# Patient Record
Sex: FEMALE | Race: WHITE | NOT HISPANIC OR LATINO | Employment: FULL TIME | URBAN - METROPOLITAN AREA
[De-identification: names, ages, dates, MRNs, and addresses within clinical notes are randomized per-mention and may not be internally consistent; named-entity substitution may affect disease eponyms.]

---

## 2019-07-17 ENCOUNTER — TRANSCRIBE ORDERS (OUTPATIENT)
Dept: ADMINISTRATIVE | Facility: HOSPITAL | Age: 49
End: 2019-07-17

## 2019-07-17 DIAGNOSIS — K21.9 GERD WITHOUT ESOPHAGITIS: Primary | ICD-10-CM

## 2019-08-08 ENCOUNTER — HOSPITAL ENCOUNTER (OUTPATIENT)
Facility: HOSPITAL | Age: 49
Setting detail: HOSPITAL OUTPATIENT SURGERY
Discharge: HOME | End: 2019-08-08
Attending: SURGERY | Admitting: SURGERY

## 2019-08-08 PROCEDURE — 91034 GASTROESOPHAGEAL REFLUX TEST: CPT | Performed by: SURGERY

## 2019-08-08 PROCEDURE — 91010 ESOPHAGUS MOTILITY STUDY: CPT | Performed by: SURGERY

## 2019-08-08 RX ORDER — LIDOCAINE HYDROCHLORIDE 20 MG/ML
SOLUTION OROPHARYNGEAL AS NEEDED
Status: DISCONTINUED | OUTPATIENT
Start: 2019-08-08 | End: 2019-08-08 | Stop reason: HOSPADM

## 2019-08-15 ENCOUNTER — HOSPITAL ENCOUNTER (OUTPATIENT)
Dept: GENERAL RADIOLOGY | Facility: HOSPITAL | Age: 49
Discharge: HOME | End: 2019-08-15
Admitting: SURGERY

## 2019-08-15 ENCOUNTER — HOSPITAL ENCOUNTER (OUTPATIENT)
Dept: NUCLEAR MEDICINE | Facility: HOSPITAL | Age: 49
Discharge: HOME | End: 2019-08-15

## 2019-08-15 DIAGNOSIS — K21.9 GERD WITHOUT ESOPHAGITIS: ICD-10-CM

## 2019-08-15 PROCEDURE — 0 TECHNETIUM SULFUR COLLOID: Performed by: SURGERY

## 2019-08-15 PROCEDURE — A9541 TC99M SULFUR COLLOID: HCPCS | Performed by: SURGERY

## 2019-08-15 PROCEDURE — 78264 GASTRIC EMPTYING IMG STUDY: CPT

## 2019-08-15 PROCEDURE — 74220 X-RAY XM ESOPHAGUS 1CNTRST: CPT

## 2019-08-15 RX ADMIN — BARIUM SULFATE 150 ML: 960 POWDER, FOR SUSPENSION ORAL at 13:34

## 2019-08-15 RX ADMIN — TECHNETIUM TC 99M SULFUR COLLOID 1 DOSE: KIT at 11:29

## 2021-03-29 ENCOUNTER — TRANSCRIBE ORDERS (OUTPATIENT)
Dept: ADMINISTRATIVE | Facility: HOSPITAL | Age: 51
End: 2021-03-29

## 2021-03-29 DIAGNOSIS — Z12.31 VISIT FOR SCREENING MAMMOGRAM: Primary | ICD-10-CM

## 2021-04-01 ENCOUNTER — OFFICE VISIT (OUTPATIENT)
Dept: OBSTETRICS AND GYNECOLOGY | Facility: CLINIC | Age: 51
End: 2021-04-01

## 2021-04-01 ENCOUNTER — HOSPITAL ENCOUNTER (OUTPATIENT)
Dept: MAMMOGRAPHY | Facility: HOSPITAL | Age: 51
Discharge: HOME | End: 2021-04-01
Admitting: OBSTETRICS & GYNECOLOGY

## 2021-04-01 VITALS
SYSTOLIC BLOOD PRESSURE: 100 MMHG | HEIGHT: 65 IN | DIASTOLIC BLOOD PRESSURE: 62 MMHG | WEIGHT: 140 LBS | BODY MASS INDEX: 23.32 KG/M2

## 2021-04-01 DIAGNOSIS — N89.8 VAGINAL MASS: ICD-10-CM

## 2021-04-01 DIAGNOSIS — Z01.419 WOMEN'S ANNUAL ROUTINE GYNECOLOGICAL EXAMINATION: Primary | ICD-10-CM

## 2021-04-01 DIAGNOSIS — Z12.31 VISIT FOR SCREENING MAMMOGRAM: ICD-10-CM

## 2021-04-01 PROCEDURE — 77063 BREAST TOMOSYNTHESIS BI: CPT

## 2021-04-01 PROCEDURE — 77067 SCR MAMMO BI INCL CAD: CPT | Performed by: RADIOLOGY

## 2021-04-01 PROCEDURE — 99396 PREV VISIT EST AGE 40-64: CPT | Performed by: OBSTETRICS & GYNECOLOGY

## 2021-04-01 PROCEDURE — 77067 SCR MAMMO BI INCL CAD: CPT

## 2021-04-01 PROCEDURE — 77063 BREAST TOMOSYNTHESIS BI: CPT | Performed by: RADIOLOGY

## 2021-04-01 PROCEDURE — 99212 OFFICE O/P EST SF 10 MIN: CPT | Performed by: OBSTETRICS & GYNECOLOGY

## 2021-04-01 RX ORDER — ALPRAZOLAM 1 MG/1
TABLET ORAL
COMMUNITY
Start: 2021-03-27

## 2021-04-01 RX ORDER — BISOPROLOL FUMARATE 10 MG/1
TABLET, FILM COATED ORAL
COMMUNITY
Start: 2021-03-20

## 2021-04-01 RX ORDER — FLUOXETINE 20 MG/1
TABLET, FILM COATED ORAL
COMMUNITY
Start: 2021-02-09

## 2021-04-01 RX ORDER — MESALAMINE 1.2 G/1
TABLET, DELAYED RELEASE ORAL
COMMUNITY
Start: 2021-03-20

## 2021-04-01 RX ORDER — LAMOTRIGINE 200 MG/1
TABLET ORAL
COMMUNITY
Start: 2021-03-20

## 2021-04-01 RX ORDER — DEXLANSOPRAZOLE 60 MG/1
CAPSULE, DELAYED RELEASE ORAL
COMMUNITY
Start: 2021-03-20

## 2021-04-01 NOTE — PROGRESS NOTES
GYN Annual Exam     CC - Here for annual exam.        HPI  Kailey Young is a 50 y.o. female, , who presents for annual well woman exam. No LMP recorded. Patient has had an ablation..  Periods are absent .  Dysmenorrhea:none.  Patient reports problems with: none. There were no changes to her medical or surgical history since her last visit.. Partner Status: Marital Status: .  New Partners since last visit: no.  Desires STD Screening: no.    Additional OB/GYN History   Current contraception: contraceptive methods: Vasectomy   Desires to: do not start contraception  Last Pap :   Last Completed Pap Smear       Status Date      PAP SMEAR Done 2018 negative        History of abnormal Pap smear: yes - atypia many years ago  Family history of uterine, colon, breast, or ovarian cancer: yes - colon cancer in her maternal grandmother and grandfather and breast cancer in her paternal aunt  Performs monthly Self-Breast Exam: no  Last mammogram: Completed this morning  Last Completed Mammogram       Status Date      MAMMOGRAM Done 2016 MAMMO DIAGNOSTIC RIGHT W CAD     Patient has more history with this topic...         Exercises Regularly: yes  Feelings of Anxiety or Depression: yes - controlled with medication  Tobacco Usage?: No   OB History        1    Para   1    Term   1            AB        Living   1       SAB        TAB        Ectopic        Molar        Multiple        Live Births                    Health Maintenance   Topic Date Due   • Annual Gynecologic Pelvic and Breast Exam  Never done   • COLONOSCOPY  Never done   • ANNUAL PHYSICAL  Never done   • TDAP/TD VACCINES (1 - Tdap) Never done   • HEPATITIS C SCREENING  Never done   • MAMMOGRAM  2021   • INFLUENZA VACCINE  2021   • PAP SMEAR  2021   • Pneumococcal Vaccine 0-64  Aged Out   • MENINGOCOCCAL VACCINE  Aged Out       The additional following portions of the patient's history were reviewed and updated  "as appropriate: allergies, current medications, past family history, past medical history, past social history, past surgical history and problem list.    Review of Systems   Constitutional: Negative.    HENT: Negative.    Eyes: Negative.    Respiratory: Negative.    Cardiovascular: Negative.    Gastrointestinal: Negative.    Endocrine: Negative.    Genitourinary: Negative.    Musculoskeletal: Negative.    Skin: Negative.    Allergic/Immunologic: Negative.    Neurological: Negative.    Hematological: Negative.    Psychiatric/Behavioral: Negative.      All other systems reviewed and are negative.     I have reviewed and agree with the HPI, ROS, and historical information as entered above. Lucas Cifuentes MD    Objective   /62   Ht 165.1 cm (65\")   Wt 63.5 kg (140 lb)   BMI 23.30 kg/m²     Physical Exam  Vitals and nursing note reviewed. Exam conducted with a chaperone present.   Constitutional:       Appearance: She is well-developed.   HENT:      Head: Normocephalic and atraumatic.   Neck:      Thyroid: No thyroid mass or thyromegaly.   Cardiovascular:      Rate and Rhythm: Normal rate and regular rhythm.      Heart sounds: No murmur heard.     Pulmonary:      Effort: Pulmonary effort is normal. No retractions.      Breath sounds: Normal breath sounds. No wheezing, rhonchi or rales.   Chest:      Chest wall: No mass or tenderness.      Breasts:         Right: Normal. No mass, nipple discharge, skin change or tenderness.         Left: Normal. No mass, nipple discharge, skin change or tenderness.   Abdominal:      General: Bowel sounds are normal.      Palpations: Abdomen is soft. Abdomen is not rigid. There is no mass.      Tenderness: There is no abdominal tenderness. There is no guarding.      Hernia: No hernia is present. There is no hernia in the left inguinal area.   Genitourinary:     Labia:         Right: No rash, tenderness or lesion.         Left: No rash, tenderness or lesion.       Vagina: Normal. " No vaginal discharge or lesions.      Cervix: No cervical motion tenderness, discharge, lesion or cervical bleeding.      Uterus: Normal. Not enlarged, not fixed and not tender.       Adnexa:         Right: No mass or tenderness.          Left: No mass or tenderness.        Rectum: No external hemorrhoid.            Comments: There is a 1 to 2 cm hard smooth round mass at 12:00 about 1-1/2 to 2 cm anterior to the cervix on the front of the vaginal wall, there is is retroverted feels slightly enlarged.  Musculoskeletal:      Cervical back: Normal range of motion. No muscular tenderness.   Neurological:      Mental Status: She is alert and oriented to person, place, and time.   Psychiatric:         Behavior: Behavior normal.            Assessment and Plan    Problem List Items Addressed This Visit     None      Visit Diagnoses     Women's annual routine gynecological examination    -  Primary    Relevant Orders    Pap IG, Rfx HPV ASCU    Vaginal mass            1. Ultrasound done by me in the room revealed the uterus which appears to have small fibroids.  I cannot elucidate this mass that is in the 12 o'clock position in the anterior wall of the vagina just head of the cervix.  Feels like a benign lesion either a sebaceous cyst ectopic fibroid or some other vaginal mass.  We will repeat an ultrasound in 1 month and decide what to do removing it would require hospitalization and outpatient surgery.  It could be a urethral diverticulum.  2. Fibrocystic breast changes - Encouraged decreasing caffeine, supportive bra, low dose vitamin E supplementation.  3. Reviewed monthly self breast exams.  Instructed to call with lumps, pain, or breast discharge.    4. Symptoms of menopausal transition reviewed with patient.   5. Other: She will return in 1 month for an ultrasound to check the vaginal mass.    Lucas Cifuentes MD  04/01/2021

## 2021-04-06 DIAGNOSIS — Z01.419 WOMEN'S ANNUAL ROUTINE GYNECOLOGICAL EXAMINATION: ICD-10-CM

## 2021-04-27 ENCOUNTER — HOSPITAL ENCOUNTER (OUTPATIENT)
Dept: MAMMOGRAPHY | Facility: HOSPITAL | Age: 51
Discharge: HOME | End: 2021-04-27

## 2021-04-27 ENCOUNTER — HOSPITAL ENCOUNTER (OUTPATIENT)
Dept: ULTRASOUND IMAGING | Facility: HOSPITAL | Age: 51
Discharge: HOME | End: 2021-04-27

## 2021-04-27 DIAGNOSIS — R92.8 ABNORMAL MAMMOGRAM: ICD-10-CM

## 2021-04-27 PROCEDURE — G0279 TOMOSYNTHESIS, MAMMO: HCPCS

## 2021-04-27 PROCEDURE — 76642 ULTRASOUND BREAST LIMITED: CPT

## 2021-04-27 PROCEDURE — 77065 DX MAMMO INCL CAD UNI: CPT | Performed by: RADIOLOGY

## 2021-04-27 PROCEDURE — 76642 ULTRASOUND BREAST LIMITED: CPT | Performed by: RADIOLOGY

## 2021-04-27 PROCEDURE — 77061 BREAST TOMOSYNTHESIS UNI: CPT | Performed by: RADIOLOGY

## 2021-04-27 PROCEDURE — 77065 DX MAMMO INCL CAD UNI: CPT

## 2021-04-29 ENCOUNTER — OFFICE VISIT (OUTPATIENT)
Dept: OBSTETRICS AND GYNECOLOGY | Facility: CLINIC | Age: 51
End: 2021-04-29

## 2021-04-29 VITALS
BODY MASS INDEX: 22.99 KG/M2 | WEIGHT: 138 LBS | SYSTOLIC BLOOD PRESSURE: 100 MMHG | HEIGHT: 65 IN | DIASTOLIC BLOOD PRESSURE: 70 MMHG

## 2021-04-29 DIAGNOSIS — N89.8 VAGINAL MASS: ICD-10-CM

## 2021-04-29 DIAGNOSIS — D25.1 FIBROIDS, INTRAMURAL: Primary | ICD-10-CM

## 2021-04-29 PROCEDURE — 99212 OFFICE O/P EST SF 10 MIN: CPT | Performed by: OBSTETRICS & GYNECOLOGY

## 2021-04-29 NOTE — PROGRESS NOTES
Chief Complaint   Patient presents with   • Follow-up     u/s for vaginal mass       Subjective   HPI  Kailey Young is a 50 y.o. female, , who presents for follow up u/s for vaginal mass on exam, pain with IC, bleeding with IC.        Her last LMP was No LMP recorded. Patient has had an ablation..  Periods are absent due to h/o ablation, lasting 0 days.  Dysmenorrhea:none.  Patient reports problems with: pain with IC, bleeding with IC.  Partner Status: Marital Status: .  New Partners since last visit: no.  Desires STD Screening: no.    Additional OB/GYN History   Current contraception: contraceptive methods: None  Desires to: do not start contraception  Last Pap :   Last Completed Pap Smear       Status Date      PAP SMEAR Done 2021 SCANNED - PAP SMEAR     Patient has more history with this topic...        History of abnormal Pap smear: yes - No  Last mammogram:   Last Completed Mammogram       Status Date      MAMMOGRAM Done 2021 MAMMO DIAGNOSTIC DIGITAL TOMOSYNTHESIS RIGHT W CAD     Patient has more history with this topic...        Tobacco Usage?: No   OB History        1    Para   1    Term   1            AB        Living   1       SAB        TAB        Ectopic        Molar        Multiple        Live Births                    Health Maintenance   Topic Date Due   • COLONOSCOPY  Never done   • ANNUAL PHYSICAL  Never done   • TDAP/TD VACCINES (1 - Tdap) Never done   • HEPATITIS C SCREENING  Never done   • INFLUENZA VACCINE  2021   • Annual Gynecologic Pelvic and Breast Exam  2022   • MAMMOGRAM  2022   • PAP SMEAR  2024   • COVID-19 Vaccine  Completed   • Pneumococcal Vaccine 0-64  Aged Out       The additional following portions of the patient's history were reviewed and updated as appropriate: allergies and current medications.    Review of Systems   Constitutional: Negative.    HENT: Negative.    Respiratory: Negative.    Cardiovascular:  "Negative.    Gastrointestinal: Negative.    Genitourinary: Positive for dyspareunia.   Musculoskeletal: Negative.    Skin: Negative.    Allergic/Immunologic: Negative.    Neurological: Negative.    Hematological: Negative.    Psychiatric/Behavioral: Negative.        I have reviewed and agree with the HPI, ROS, and historical information as entered above. Lucas Cifuentes MD    Objective   /70   Ht 165.1 cm (65\")   Wt 62.6 kg (138 lb)   BMI 22.96 kg/m²     Physical Exam not done    Assessment/Plan     Assessment     Problem List Items Addressed This Visit     None      Visit Diagnoses     Fibroids, intramural    -  Primary    Vaginal mass              1. Patient on ultrasound has a 2 cm mass in the anterior vaginal fornix adjacent to the cervix.  I cannot see it but I can feel it.  Ultrasound confirms what looks like an amorphous pelvic mass in the vagina at that spot which is not cystic.  She also has several fibroids in the uterus.  She has had an ablation as well.  She has some sort of pain with intercourse which may be related to this mass.    Plan     Return in about 1 week (around 5/6/2021) for will drain breast cyst then .  1. Due to the odd nature of this finding, I have recommended she see Dr. Khoury her partner for evaluation and possible removal.  Mass may connect to the cervix could be different from anything of seen and may involve the bladder.  I do not feel comfortable removing it myself plus she may need a hysterectomy at the same time.  I am glad Dr. Khoury evaluate the mass and decide was the best course of action.  She may see Dr. Hernandez her partner      Lucas Cifuentes MD  04/29/2021    "

## 2021-05-03 ENCOUNTER — OFFICE VISIT (OUTPATIENT)
Dept: GYNECOLOGIC ONCOLOGY | Facility: CLINIC | Age: 51
End: 2021-05-03

## 2021-05-03 VITALS
DIASTOLIC BLOOD PRESSURE: 68 MMHG | BODY MASS INDEX: 23.22 KG/M2 | TEMPERATURE: 97.8 F | HEIGHT: 64 IN | RESPIRATION RATE: 16 BRPM | WEIGHT: 136 LBS | SYSTOLIC BLOOD PRESSURE: 114 MMHG | OXYGEN SATURATION: 98 % | HEART RATE: 60 BPM

## 2021-05-03 DIAGNOSIS — N89.8 VAGINAL MASS: Primary | ICD-10-CM

## 2021-05-03 PROCEDURE — 99204 OFFICE O/P NEW MOD 45 MIN: CPT | Performed by: OBSTETRICS & GYNECOLOGY

## 2021-05-03 NOTE — PROGRESS NOTES
New Patient Office Visit      Patient Name: Kailey Young  : 1970   MRN: [id number]     Referring Physician: Lucas Cifuentes MD    Chief Complaint: Vaginal Mass     History of Present Illness: Kailey Young is a 51 y.o. female who is here today to establish care with Oncology. The patient complains of a anterior vaginal wall mass.  She is s/p endometrial ablation. Patient on ultrasound has a 2 cm mass in the anterior vaginal fornix adjacent to the cervix. Ultrasound confirms what looks like an amorphous pelvic mass in the vagina at that spot which is not cystic.  She also has several fibroids in the uterus. Patient reports pain with intercourse since the beginning of the pandemic. Pain is also reproducible on speculum exam and during transvaginal ultrasound. Pain is significant enough during intercourse that she does have to occasionally stop. She would like definitive surgical management of this problem.     Past Medical History:   Past Medical History:   Diagnosis Date   • Anemia    • Colon polyps    • Depression    • Hx of colonic polyps    • Hypertension    • IBS (irritable bowel syndrome)    • Ulcerative colitis (CMS/HCC)        Past Surgical History:   Past Surgical History:   Procedure Laterality Date   • ADENOIDECTOMY     • BREAST BIOPSY Bilateral    • BREAST EXCISIONAL BIOPSY Left 2016    BENIGN   • DIAGNOSTIC LAPAROSCOPY     • EAR TUBES     • ENDOMETRIAL ABLATION     • ESOPHAGEAL MOTILITY STUDY N/A 2019    Procedure: MANOMETRY;  Surgeon: Anup Fletcher MD;  Location:  [facility];  Service: Gastroenterology   • FRACTURE SURGERY      arm   • GASTRIC PH MONITORING 24HR  2019    Procedure: GASTRIC PH MONITORING 24HR;  Surgeon: Anup Fletcher MD;  Location:  [facility];  Service: Gastroenterology   • WISDOM TOOTH EXTRACTION         Family History:   Family History   Problem Relation Age of Onset   • Breast cancer Paternal Aunt    • Hypertension Father    •  Liver cancer Father    • Arthritis Mother    • Osteoporosis Mother    • Thyroid disease Mother    • Colon cancer Maternal Grandmother    • Colon cancer Maternal Grandfather    • Colon cancer Paternal Uncle    • Ovarian cancer Neg Hx    • Uterine cancer Neg Hx    • Melanoma Neg Hx        Social History:   Social History     Socioeconomic History   • Marital status:      Spouse name: Not on file   • Number of children: Not on file   • Years of education: Not on file   • Highest education level: Not on file   Tobacco Use   • Smoking status: Never Smoker   • Smokeless tobacco: Never Used   Vaping Use   • Vaping Use: Never used   Substance and Sexual Activity   • Alcohol use: Never     Comment: light   • Drug use: Never   • Sexual activity: Yes       Past OB/GYN History:   OB History    Para Term  AB Living   1 1 1     1   SAB TAB Ectopic Molar Multiple Live Births                    # Outcome Date GA Lbr Julio/2nd Weight Sex Delivery Anes PTL Lv   1 Term            1   Denies any complications with delivery.  Has a history of endometrial ablation due to heavy bleeding.   Denies any history of abnormal pap tests     Health Maintenance:   Mammogram: Date:  Results: large right breast cyst but otherwise benign  Colonoscopy: Date: ?, history of colonic polyps and ulcerative colitis    Medications:     Current Outpatient Medications:   •  ALPRAZolam (XANAX) 1 MG tablet, , Disp: , Rfl:   •  bisoprolol (ZEBeta) 10 MG tablet, , Disp: , Rfl:   •  Dexilant 60 MG capsule, , Disp: , Rfl:   •  FLUoxetine (PROzac) 20 MG tablet, , Disp: , Rfl:   •  lamoTRIgine (LaMICtal) 200 MG tablet, , Disp: , Rfl:   •  mesalamine (LIALDA) 1.2 g EC tablet, , Disp: , Rfl:     Allergies:   Allergies   Allergen Reactions   • Codeine Nausea And Vomiting       Review of Systems:   Review of Systems   Constitutional: Negative for appetite change, chills, fatigue, fever and unexpected weight change.   HENT: Negative for congestion  "and sore throat.    Eyes: Negative for visual disturbance.   Respiratory: Negative for cough, shortness of breath and wheezing.    Cardiovascular: Negative for chest pain, palpitations and leg swelling.   Gastrointestinal: Negative for abdominal distention, abdominal pain, constipation, diarrhea, nausea and vomiting.   Genitourinary: Positive for dyspareunia and pelvic pain. Negative for dysuria, frequency, hematuria, urgency and vaginal bleeding.   Neurological: Negative for dizziness, light-headedness, numbness and headaches.        Objective     Physical Exam:  Vital Signs:   Vitals:    05/03/21 1416   BP: 114/68  Comment: LUE   Pulse: 60   Resp: 16   Temp: 97.8 °F (36.6 °C)   TempSrc: Infrared   SpO2: 98%  Comment: RA   Weight: 61.7 kg (136 lb)   Height: 162.6 cm (64\")   PainSc: 0-No pain     BMI: Body mass index is 23.34 kg/m².   ECOG score: 0           PHQ-2 Depression Screening  Little interest or pleasure in doing things? 1   Feeling down, depressed, or hopeless? 2   PHQ-2 Total Score 15     Physical Exam  Vitals and nursing note reviewed. Exam conducted with a chaperone present.   Constitutional:       General: She is not in acute distress.     Appearance: Normal appearance. She is well-developed. She is not diaphoretic.   HENT:      Head: Normocephalic and atraumatic.      Right Ear: External ear normal.      Left Ear: External ear normal.      Nose: Nose normal.   Eyes:      General: No scleral icterus.        Right eye: No discharge.         Left eye: No discharge.      Conjunctiva/sclera: Conjunctivae normal.   Neck:      Thyroid: No thyromegaly.   Cardiovascular:      Rate and Rhythm: Normal rate and regular rhythm.      Heart sounds: No murmur heard.     Pulmonary:      Effort: Pulmonary effort is normal. No respiratory distress.      Breath sounds: Normal breath sounds. No wheezing.   Abdominal:      General: Bowel sounds are normal. There is no distension.      Palpations: Abdomen is soft. There is " "no mass.      Tenderness: There is no abdominal tenderness. There is no guarding.      Hernia: No hernia is present.   Genitourinary:     Comments: External genitalia normal. Vagina without discharge. 2 cm smooth-walled mass along the left anterio-lateral vaginal wall. Minimally tender on exam. Cervix normal. Uterus normal and mobile. No adnexal masses. Rectovaginal exam deferred.  Musculoskeletal:         General: No swelling, tenderness, deformity or signs of injury.      Cervical back: Normal range of motion and neck supple.      Right lower leg: No edema.      Left lower leg: No edema.   Lymphadenopathy:      Cervical: No cervical adenopathy.   Skin:     General: Skin is warm and dry.      Findings: No erythema, lesion or rash.   Neurological:      General: No focal deficit present.      Mental Status: She is alert and oriented to person, place, and time. Mental status is at baseline.   Psychiatric:         Behavior: Behavior normal.         Thought Content: Thought content normal.         Assessment / Plan    Kailey Young is a 51 y.o. who presents today with a vaginal mass. Report of ultrasound from Dr. Cifuentes's office with an \"amorphous 2 cm mass in vaginal wall that is solid in appearance\". I am unable to review imaging. Exam today consistent with 2 cm mass in typical location for Rios's duct cyst. It does not feel like it is attached to the cervix or uterus. However, in light of patient's desire for surgical removal due to symptoms I would like to obtain a pelvic MRI to better delineate the soft tissue structures of the pelvis and for surgical planning.  I am hopeful that this will be amenable to a vaginal approach for excision of this mass.  We did discuss that in the event that we can do this vaginally this would be an outpatient procedure.  Risk of the procedure will be quite minimal for may include bleeding, infection, injury to neighboring structures such as the bladder or the bowel though " this would be unlikely.  We also discussed the unlikely event of vaginal scarring.  If an MRI does demonstrate that this mass is attached to the cervix we may have to proceed with an abdominal route of excision including a hysterectomy for complete surgical removal.  I will review the patient's MRI images and call her at home with these results to discuss next steps. Patient verbalizes understanding.    History of depression with elevated PHQ-2: Currently on xanax 1 mg daily, prozac 20 mg and lmictal 200 mg per PCP. Recommend continued follow up with them.    History of ulcerative colitis: Continue mesalamine. Will ultimately need to hold this 1 day prior to and 3 days postoperatively.     Pain assessment was performed today as a part of patient’s care.  For patients with pain related to surgery, gynecologic malignancy or cancer treatment, the plan is as noted in the assessment/plan.  For patients with pain not related to these issues, they are to seek any further needed care from a more appropriate provider, such as PCP.      Diagnoses and all orders for this visit:    1. Vaginal mass (Primary)  -     US Non-OB Transvaginal; Future  -     MRI Pelvis With & Without Contrast; Future      Follow Up: Pending MRI results    A total of 45 minutes was spent on this visit, with 40 minutes spent reviewing previous notes, counseling the patient on differential diagnosis of vaginal masses and their management approaches, ordering tests, refilling meds, and documenting the findings in the note.     JESSICA Hernandez MD  Gynecologic Oncology     Please note that portions of this note may have been completed with a voice recognition program.

## 2021-05-06 ENCOUNTER — OFFICE VISIT (OUTPATIENT)
Dept: OBSTETRICS AND GYNECOLOGY | Facility: CLINIC | Age: 51
End: 2021-05-06

## 2021-05-06 VITALS
WEIGHT: 135.4 LBS | HEIGHT: 65 IN | BODY MASS INDEX: 22.56 KG/M2 | SYSTOLIC BLOOD PRESSURE: 110 MMHG | DIASTOLIC BLOOD PRESSURE: 70 MMHG

## 2021-05-06 DIAGNOSIS — N60.01 BENIGN BREAST CYST IN FEMALE, RIGHT: Primary | ICD-10-CM

## 2021-05-06 PROCEDURE — 19000 PUNCTURE ASPIR CYST BREAST: CPT | Performed by: OBSTETRICS & GYNECOLOGY

## 2021-05-06 NOTE — PROGRESS NOTES
Breast cyst aspiration        Patient right breast prepped with Betadine at the 11 o'clock position.  A number 18-gauge needle was used to aspirate the cyst which was drained of 14 cc of straw-colored fluid.  There was no anesthesia needed and the patient tolerated procedure very well with no complications or complaints.  This fluid will be sent to pathology.

## 2021-05-06 NOTE — PROGRESS NOTES
Chief Complaint   Patient presents with   • Follow-up     Breast Cyst       Subjective     HPI  Kailey Young is a 51 y.o. female, , who presents with breast complaints of a lump.  This is present in right breast(s).    She states she has experienced this problem for 1 month.  She describes the severity as localized.  She states that the problem is stable.  She reports breast lump and breast tenderness.  The patient denies changed mole, dryness and pruritus    Her last LMP was No LMP recorded. Patient has had an ablation..  Periods are absent due to ablation .    Current contraception: None    Last mammogram:   Last Completed Mammogram          MAMMOGRAM (Yearly) Next due on 2021  Mammo Diagnostic Digital Tomosynthesis Right With CAD    2021  Mammo Screening Digital Tomosynthesis Bilateral With CAD    2016  Mammo diagnostic right w CAD    2016  Mammo screening bilateral w CAD    2015  MAMMOGRAPHY SCREENING BILATERAL    Only the first 5 history entries have been loaded, but more history exists.              Tobacco Usage?: No     OB History        1    Para   1    Term   1            AB        Living   1       SAB        TAB        Ectopic        Molar        Multiple        Live Births                    Past Medical History:   Diagnosis Date   • Anemia    • Colon polyps    • Depression    • Hx of colonic polyps    • Hypertension    • IBS (irritable bowel syndrome)    • Ulcerative colitis (CMS/HCC)        Past Surgical History:   Procedure Laterality Date   • ADENOIDECTOMY     • BREAST BIOPSY Bilateral    • BREAST EXCISIONAL BIOPSY Left     BENIGN   • DIAGNOSTIC LAPAROSCOPY     • EAR TUBES     • ENDOMETRIAL ABLATION     • ESOPHAGEAL MOTILITY STUDY N/A 2019    Procedure: MANOMETRY;  Surgeon: Anup Fletcher MD;  Location: [facility];  Service: Gastroenterology   • FRACTURE SURGERY      arm   • GASTRIC PH MONITORING 24HR   "8/8/2019    Procedure: GASTRIC PH MONITORING 24HR;  Surgeon: Anup Fletcher MD;  Location: [facility];  Service: Gastroenterology   • WISDOM TOOTH EXTRACTION         Family History   Problem Relation Age of Onset   • Breast cancer Paternal Aunt    • Hypertension Father    • Liver cancer Father    • Arthritis Mother    • Osteoporosis Mother    • Thyroid disease Mother    • Colon cancer Maternal Grandmother    • Colon cancer Maternal Grandfather    • Colon cancer Paternal Uncle    • Ovarian cancer Neg Hx    • Uterine cancer Neg Hx    • Melanoma Neg Hx    • Prostate cancer Neg Hx    • Deep vein thrombosis Neg Hx    • Pulmonary embolism Neg Hx    • Coronary artery disease Neg Hx    • Stroke Neg Hx    • Diabetes Neg Hx           Review of Systems   Constitutional: Negative.    HENT: Negative.    Eyes: Negative.    Respiratory: Negative.    Cardiovascular: Negative.    Gastrointestinal: Negative.    Endocrine: Negative.    Genitourinary: Negative.    Musculoskeletal: Negative.    Skin: Negative.    Allergic/Immunologic: Negative.    Neurological: Negative.    Hematological: Negative.    Psychiatric/Behavioral: Negative.        I have reviewed and agree with the HPI, ROS, and historical information as entered above. Lucas Cifuentes MD    Objective   /70   Ht 165.1 cm (65\")   Wt 61.4 kg (135 lb 6.4 oz)   Breastfeeding No   BMI 22.53 kg/m²     Physical Exam  Vitals and nursing note reviewed. Exam conducted with a chaperone present.   Pulmonary:      Effort: No retractions.   Chest:      Chest wall: No mass.      Breasts:         Right: No mass, nipple discharge, skin change or tenderness.         Left: No mass, nipple discharge, skin change or tenderness.          Comments: Six 8 cm area of cyst          Assessment/Plan     Assessment     Problem List Items Addressed This Visit     None      Visit Diagnoses     Benign breast cyst in female, right    -  Primary    Relevant Orders    Tissue Pathology Exam    "         Plan   1.  Discussed fibrocystic disease and alleviating measures.  2. Breast cyst right breast at 9:00 aspirated of 14 cc of straw-colored fluid  3. Patient also is having surgery with Dr. Khoury to remove a cystic structure in the upper vagina.  This may include a hysterectomy  No follow-ups on file.      Lucas Cifuentes MD  05/06/2021

## 2021-05-10 ENCOUNTER — HOSPITAL ENCOUNTER (OUTPATIENT)
Dept: MRI IMAGING | Facility: HOSPITAL | Age: 51
Discharge: HOME | End: 2021-05-10
Admitting: OBSTETRICS & GYNECOLOGY

## 2021-05-10 DIAGNOSIS — N89.8 VAGINAL MASS: ICD-10-CM

## 2021-05-10 PROCEDURE — 82565 ASSAY OF CREATININE: CPT

## 2021-05-10 PROCEDURE — 72197 MRI PELVIS W/O & W/DYE: CPT

## 2021-05-10 PROCEDURE — A9577 INJ MULTIHANCE: HCPCS | Performed by: OBSTETRICS & GYNECOLOGY

## 2021-05-10 PROCEDURE — 0 GADOBENATE DIMEGLUMINE 529 MG/ML SOLUTION: Performed by: OBSTETRICS & GYNECOLOGY

## 2021-05-10 RX ADMIN — GADOBENATE DIMEGLUMINE 12 ML: 529 INJECTION, SOLUTION INTRAVENOUS at 19:12

## 2021-05-11 DIAGNOSIS — N60.01 BENIGN BREAST CYST IN FEMALE, RIGHT: ICD-10-CM

## 2021-05-13 ENCOUNTER — TELEPHONE (OUTPATIENT)
Dept: GYNECOLOGIC ONCOLOGY | Facility: CLINIC | Age: 51
End: 2021-05-13

## 2021-05-13 NOTE — TELEPHONE ENCOUNTER
Caller: KAY HERNANDES    Relationship: SELF    Best call back number: [phone]    What test was performed: MRI    When was the test performed: 5/10    Additional notes: PATIENT WOULD LIKE TO SPEAK TO DR MACIAS CONCERNING HER TEST RESULTS.

## 2021-05-13 NOTE — TELEPHONE ENCOUNTER
I called patient and let her know that results are not back yet.    She understands we will call her when she results are final.

## 2021-05-14 ENCOUNTER — TELEPHONE (OUTPATIENT)
Dept: GYNECOLOGIC ONCOLOGY | Facility: CLINIC | Age: 51
End: 2021-05-14

## 2021-05-14 LAB — CREAT BLDA-MCNC: 0.8 MG/DL (ref 0.6–1.3)

## 2021-05-14 NOTE — TELEPHONE ENCOUNTER
----- Message from Shayy Hernandez MD sent at 5/14/2021 12:25 PM EDT -----  Can you let Colleen know that her MRI confirms a well-circumscribed 2.8 cm mass.  I suspect this might be fibroid.  There is no evidence of cancer.  I would recommend that we proceed to the OR for a vaginal excision of this mass.  This can be done as an outpatient at her convenience.  Please let her know I am in the OR for the rest of the day today but that I am happy to answer any questions that she might have.  ----- Message -----  From: Interface, Rad Results [place] In  Sent: 5/13/2021  10:19 PM EDT  To: Shayy Hernandez MD      Relayed MRI results to pt. I told her of MD's plan for excision. Pt is going on vacation on the 6th. The surgery scheduler is going to contact her with possible dates.

## 2021-05-17 ENCOUNTER — PATIENT EDUCATION (SURGERY INSTRUCTIONS) (OUTPATIENT)
Dept: GYNECOLOGIC ONCOLOGY | Facility: CLINIC | Age: 51
End: 2021-05-17

## 2021-05-17 NOTE — TELEPHONE ENCOUNTER
Patient had discussed results with magaly late Friday afternoon. Surgical case needed to be discussed with Dr. Hernandez before scheduling. Talked with her this morning and case scheduled. Patient sent [employer] message as discussed

## 2021-05-17 NOTE — PATIENT INSTRUCTIONS
Outpatient Pre-op Patient Education  [facility]       Kailey Young  [id number]  1970    SURGEON: Shayy Hernandez MD    Surgery Coordinator : Zita   If you have any questions             If you have FMLA paperwork or Short Term Disability, please give that to the  either during your visit or after your surgery. A family member may bring the paper work if you can't or your employer may fax it to [phone].               Appointment  1. You have Covid Testing on 05/31/2021 at 02:00 pm . This is located at [address] [place] [address] located in the lower level of the building ( basement ). Upon arrival please park in the designated parking spaces located to the left of the building. Once parked, please stay in your car and call [phone]. A member of the clinic will conduct your screening before leaving your vehicle.    1. Your surgery has been scheduled on 06/02/2021 at [facility] located at [address] [facility] [address] [facility]. You will need to be there at 06:00 am        The Day(s) Before Surgery  1.  Do not drink alcohol or smoke.     2.  Do not take vitamins or aspirin one week before surgery.       3.  If you are ill on the days leading up to your surgery, please call our office.      4.  If you are using medications for diabetes, call the physician who manages these and get instructions on how they should be taken before and after surgery.    5.  Nothing to eat or drink after midnight on 06/01/2021      6.  Please make prior arrangements for someone to drive you home after your procedure        The Day of Surgery  1.  Do not eat, drink, or chew gum.     2.  On the morning of your surgery, you may take her prescription medications with a sip of water. Bring all medication with you to the surgery center. (Diabetic patients should bring insulin if instructed to do so by their diabetes managing physician).   3. Bathe or shower the morning of your  surgery. Do not use powders, lotions, or creams. Deodorants are okay.     4. Wear loose, comfortable clothing.     5. Bring holders for glasses, contacts, or dentures.      6. Bring any required payment and forms, including insurance cards. Leave all money and valuables at home.        Post-surgery Instructions   1.  For the first 24 hours, rest and take periodic deep breaths to remove anesthetic agents from your body.    2.  Follow any specific instructions relevant to your particular surgery.      3.  Limit activity to avoid stress to the surgical site.       4.  Keep all dressings dry. Shower or bathe as instructed by your doctor.      5.  Drink and eat light foods. Remain on liquids alone only if nausea and vomiting occur. Return to your regular diet gradually, as tolerance allows.     6. Avoid alcohol for at least 24 hours.       7.  Take prescription pain medication as directed and with food.       8.  Call our office after discharge from the surgery center to make your post-op appointment.         In Case of Emergency:       Call our office if you experience any of the following:    Excessive drainage, bleeding, swelling, or redness at the incision site   Severe pain not eased by pain medication  Temperature above 101    Persistent nausea or vomiting    Skin rash or general body itching

## 2021-05-31 ENCOUNTER — APPOINTMENT (OUTPATIENT)
Dept: PREADMISSION TESTING | Facility: HOSPITAL | Age: 51
End: 2021-05-31

## 2021-05-31 LAB — SARS-COV-2 RNA PNL SPEC NAA+PROBE: NOT DETECTED

## 2021-05-31 PROCEDURE — C9803 HOPD COVID-19 SPEC COLLECT: HCPCS

## 2021-05-31 PROCEDURE — U0004 COV-19 TEST NON-CDC HGH THRU: HCPCS

## 2021-06-02 ENCOUNTER — OUTSIDE FACILITY SERVICE (OUTPATIENT)
Dept: GYNECOLOGIC ONCOLOGY | Facility: CLINIC | Age: 51
End: 2021-06-02

## 2021-06-02 ENCOUNTER — LAB REQUISITION (OUTPATIENT)
Dept: LAB | Facility: HOSPITAL | Age: 51
End: 2021-06-02

## 2021-06-02 DIAGNOSIS — N89.8 VAGINAL MASS: Primary | ICD-10-CM

## 2021-06-02 DIAGNOSIS — N89.8 OTHER SPECIFIED NONINFLAMMATORY DISORDERS OF VAGINA: ICD-10-CM

## 2021-06-02 PROCEDURE — 58140 MYOMECTOMY ABDOM METHOD: CPT | Performed by: OBSTETRICS & GYNECOLOGY

## 2021-06-02 PROCEDURE — 88305 TISSUE EXAM BY PATHOLOGIST: CPT | Performed by: OBSTETRICS & GYNECOLOGY

## 2021-06-02 RX ORDER — OXYCODONE HYDROCHLORIDE 5 MG/1
5 TABLET ORAL EVERY 4 HOURS PRN
Qty: 5 TABLET | Refills: 0 | Status: SHIPPED | OUTPATIENT
Start: 2021-06-02 | End: 2021-06-17

## 2021-06-02 RX ORDER — DOCUSATE SODIUM 100 MG/1
100 CAPSULE, LIQUID FILLED ORAL 2 TIMES DAILY
Qty: 20 CAPSULE | Refills: 0 | Status: SHIPPED | OUTPATIENT
Start: 2021-06-02 | End: 2021-06-17

## 2021-06-02 RX ORDER — PROMETHAZINE HYDROCHLORIDE 12.5 MG/1
12.5 TABLET ORAL EVERY 6 HOURS PRN
Qty: 20 TABLET | Refills: 0 | Status: SHIPPED | OUTPATIENT
Start: 2021-06-02 | End: 2021-06-17

## 2021-06-04 LAB
CYTO UR: NORMAL
LAB AP CASE REPORT: NORMAL
LAB AP CLINICAL INFORMATION: NORMAL
PATH REPORT.FINAL DX SPEC: NORMAL
PATH REPORT.GROSS SPEC: NORMAL

## 2021-06-07 ENCOUNTER — TELEPHONE (OUTPATIENT)
Dept: GYNECOLOGIC ONCOLOGY | Facility: CLINIC | Age: 51
End: 2021-06-07

## 2021-06-07 NOTE — TELEPHONE ENCOUNTER
----- Message from Shayy Hernandez MD sent at 6/7/2021 10:26 AM EDT -----  Please let Kailey know that her pathology was consistent with a benign fibroid.

## 2021-06-07 NOTE — TELEPHONE ENCOUNTER
Called pt to inform her path was a benign fibroid. Pt was pleased, I also reminded her of her post op appt,.

## 2021-06-16 NOTE — PROGRESS NOTES
Post Operative Office Visit      Patient Name: Kailey Young  : 1970   MRN: [id number]     Chief Complaint:  Postoperative visit    History of Present Illness: Kailey Young is a 51 y.o. female who is status post excision of vaginal mass on 2021 for vagina leiomyoma. Her post operative course has been unremarkable and continues to be recovering well. She is tolerating a normal diet. She reports normal return of bowel function. She states her pain is well controlled.     Medical, surgical, and family history updated as needed.  Subjective      Review of Systems:   Review of Systems   Constitutional: Negative for chills, fatigue and fever.   Respiratory: Negative for cough, shortness of breath and wheezing.    Cardiovascular: Negative for chest pain, palpitations and leg swelling.   Gastrointestinal: Negative for abdominal pain, constipation, diarrhea, nausea and vomiting.   Genitourinary: Negative for dysuria, pelvic pain, urgency, vaginal bleeding, vaginal discharge and vaginal pain.   Neurological: Negative for dizziness and light-headedness.        Objective     Physical Exam:  Vital Signs: There were no vitals filed for this visit.  BMI: There is no height or weight on file to calculate BMI.     Physical Exam  Vitals and nursing note reviewed. Exam conducted with a chaperone present.   Constitutional:       General: She is not in acute distress.     Appearance: Normal appearance. She is well-developed. She is not diaphoretic.   HENT:      Head: Normocephalic and atraumatic.      Right Ear: External ear normal.      Left Ear: External ear normal.      Nose: Nose normal.   Eyes:      General: No scleral icterus.        Right eye: No discharge.         Left eye: No discharge.      Conjunctiva/sclera: Conjunctivae normal.   Neck:      Thyroid: No thyromegaly.   Cardiovascular:      Rate and Rhythm: Normal rate.   Pulmonary:      Effort: Pulmonary effort is normal. No respiratory distress.    Abdominal:      General: There is no distension.      Palpations: Abdomen is soft. There is no mass.      Tenderness: There is no abdominal tenderness. There is no guarding.   Genitourinary:     Comments: External genitalia normal. Vagina without discharge. Surgical site healing well and without evidence of infection. Cervix normal.  Musculoskeletal:         General: No swelling, tenderness, deformity or signs of injury.      Cervical back: Neck supple.      Right lower leg: No edema.      Left lower leg: No edema.   Lymphadenopathy:      Cervical: No cervical adenopathy.   Skin:     General: Skin is warm and dry.      Coloration: Skin is not jaundiced.      Findings: No erythema, lesion or rash.   Neurological:      General: No focal deficit present.      Mental Status: She is alert and oriented to person, place, and time. Mental status is at baseline.      Motor: No abnormal muscle tone.   Psychiatric:         Behavior: Behavior normal.         Thought Content: Thought content normal.         Medications:     Current Outpatient Medications:   •  ALPRAZolam (XANAX) 1 MG tablet, , Disp: , Rfl:   •  bisoprolol (ZEBeta) 10 MG tablet, , Disp: , Rfl:   •  Dexilant 60 MG capsule, , Disp: , Rfl:   •  docusate sodium (COLACE) 100 MG capsule, Take 1 capsule by mouth 2 (Two) Times a Day., Disp: 20 capsule, Rfl: 0  •  FLUoxetine (PROzac) 20 MG tablet, , Disp: , Rfl:   •  lamoTRIgine (LaMICtal) 200 MG tablet, , Disp: , Rfl:   •  mesalamine (LIALDA) 1.2 g EC tablet, , Disp: , Rfl:   •  oxyCODONE (Roxicodone) 5 MG immediate release tablet, Take 1 tablet by mouth Every 4 (Four) Hours As Needed for Moderate Pain ., Disp: 5 tablet, Rfl: 0  •  promethazine (PHENERGAN) 12.5 MG tablet, Take 1 tablet by mouth Every 6 (Six) Hours As Needed for Nausea or Vomiting., Disp: 20 tablet, Rfl: 0  Current outpatient and discharge medications have been reconciled for the patient.  Reviewed by: Shayy Hernandez MD      Allergies:   Allergies      Allergen Reactions   • Codeine Nausea And Vomiting       Pathology:   VAGINAL MASS, EXCISION:                 Benign leiomyoma.     Operative findings again reviewed. Pathology report discussed.       Assessment / Plan    Kailey Young is s/p excision of vaginal mass and is recovering well from her surgery. Her final pathology revealed vaginal leiomyoma which will require no further therapy. She should continue recommended cancer screening guidelines, assessment for osteoporosis, including the use of Vitamin D and calcium, and maintain a healthy lifestyle. She may continue her annual examinations/follow-up locally; however, we will be readily available if needed, particularly during the post-operative course. She is cleared to return to work.    Assessment/Plan:   Diagnoses and all orders for this visit:    1. Vaginal mass (Primary)         Follow Up:   HARSHA Hernandez MD  Gynecologic Oncology     Please note that portions of this note may have been completed with a voice recognition program.

## 2021-06-17 ENCOUNTER — OFFICE VISIT (OUTPATIENT)
Dept: GYNECOLOGIC ONCOLOGY | Facility: CLINIC | Age: 51
End: 2021-06-17

## 2021-06-17 VITALS
HEIGHT: 65 IN | OXYGEN SATURATION: 99 % | BODY MASS INDEX: 22.49 KG/M2 | SYSTOLIC BLOOD PRESSURE: 129 MMHG | DIASTOLIC BLOOD PRESSURE: 73 MMHG | WEIGHT: 135 LBS | RESPIRATION RATE: 18 BRPM | HEART RATE: 53 BPM

## 2021-06-17 DIAGNOSIS — N89.8 VAGINAL MASS: Primary | ICD-10-CM

## 2021-06-17 PROCEDURE — 99024 POSTOP FOLLOW-UP VISIT: CPT | Performed by: OBSTETRICS & GYNECOLOGY

## 2021-06-21 ENCOUNTER — TELEPHONE (OUTPATIENT)
Dept: GYNECOLOGIC ONCOLOGY | Facility: CLINIC | Age: 51
End: 2021-06-21

## 2022-04-05 ENCOUNTER — OFFICE VISIT (OUTPATIENT)
Dept: OBSTETRICS AND GYNECOLOGY | Facility: CLINIC | Age: 52
End: 2022-04-05

## 2022-04-05 VITALS
BODY MASS INDEX: 23.32 KG/M2 | DIASTOLIC BLOOD PRESSURE: 70 MMHG | HEIGHT: 65 IN | WEIGHT: 140 LBS | SYSTOLIC BLOOD PRESSURE: 112 MMHG

## 2022-04-05 DIAGNOSIS — Z01.419 PAP TEST, AS PART OF ROUTINE GYNECOLOGICAL EXAMINATION: Primary | ICD-10-CM

## 2022-04-05 PROCEDURE — 99396 PREV VISIT EST AGE 40-64: CPT | Performed by: OBSTETRICS & GYNECOLOGY

## 2022-04-05 NOTE — PROGRESS NOTES
GYN Annual Exam     CC - Here for annual exam.        HPI  Kailey Young is a 51 y.o. female, , who presents for annual well woman exam.  She is perimenopausal.  Periods are absent since ablation. patient states having intermittent spotting over the past few months, only lasting 1 day.  Dysmenorrhea:none..  Patient reports problems with: none. Since her last visit the patient underwent surgery for vaginal mass-removed by Dr. Hernandez-benign fibroid. Partner Status: Marital Status: .  New Partners since last visit: no.      Additional OB/GYN History   Current contraception: contraceptive methods: Vasectomy   Desires to: do not start contraception  On HRT? No  Last Pap : 2021. Results: negative  Last Completed Pap Smear          Ordered - PAP SMEAR (Yearly) Ordered on 2021  SCANNED - PAP SMEAR    2021  SCANNED - PAP SMEAR    2021  Done - negative    2018  Done - negative              History of abnormal Pap smear: no  Family history of uterine, colon, breast, or ovarian cancer: yes - MGF and maternal uncle x2 with h/o colon CA and paternal aunt with h/o breast CA  Performs monthly Self-Breast Exam: yes  Last mammogram: 2021. Done at .    Last Completed Mammogram          MAMMOGRAM (Yearly) Next due on 2021  Mammo Diagnostic Digital Tomosynthesis Right With CAD    2021  Mammo Screening Digital Tomosynthesis Bilateral With CAD    2016  Mammo diagnostic right w CAD    2016  Mammo screening bilateral w CAD    2015  MAMMOGRAPHY SCREENING BILATERAL    Only the first 5 history entries have been loaded, but more history exists.              Last colonoscopy:   Last Completed Colonoscopy          COLORECTAL CANCER SCREENING (COLONOSCOPY - Every 3 Years) Next due on 3/1/2025    2022  COLONOSCOPY (Done - WNL per patient, h/o UC)    2016  Occult blood x 3, stool              Last DEXA: None  Exercises  "Regularly: yes  Feelings of Anxiety or Depression: yes - depression      Tobacco Usage?: No   OB History        1    Para   1    Term   1            AB        Living   1       SAB        IAB        Ectopic        Molar        Multiple        Live Births                    Health Maintenance   Topic Date Due   • ANNUAL PHYSICAL  Never done   • TDAP/TD VACCINES (1 - Tdap) Never done   • HEPATITIS C SCREENING  Never done   • MAMMOGRAM  2022   • PAP SMEAR  2022   • Annual Gynecologic Pelvic and Breast Exam  2022   • INFLUENZA VACCINE  2022   • COLORECTAL CANCER SCREENING  2025   • COVID-19 Vaccine  Completed   • Pneumococcal Vaccine 0-64  Aged Out       The additional following portions of the patient's history were reviewed and updated as appropriate: allergies, current medications, past family history, past medical history, past social history and past surgical history.    Review of Systems   Constitutional: Negative.    HENT: Negative.    Eyes: Negative.    Respiratory: Negative.    Cardiovascular: Negative.    Gastrointestinal: Negative.    Endocrine: Negative.    Genitourinary: Negative.    Musculoskeletal: Negative.    Skin: Negative.    Allergic/Immunologic: Negative.    Neurological: Negative.    Hematological: Negative.    Psychiatric/Behavioral: Negative.        I have reviewed and agree with the HPI, ROS, and historical information as entered above. Lucas Cifuentes MD    Objective   /70   Ht 165.1 cm (65\")   Wt 63.5 kg (140 lb)   BMI 23.30 kg/m²     Physical Exam  Vitals and nursing note reviewed. Exam conducted with a chaperone present.   Constitutional:       Appearance: She is well-developed.   HENT:      Head: Normocephalic and atraumatic.   Neck:      Thyroid: No thyroid mass or thyromegaly.   Cardiovascular:      Rate and Rhythm: Normal rate and regular rhythm.      Heart sounds: No murmur heard.  Pulmonary:      Effort: Pulmonary effort is normal. No " retractions.      Breath sounds: Normal breath sounds. No wheezing, rhonchi or rales.   Chest:      Chest wall: No mass or tenderness.   Breasts:      Right: Normal. No mass, nipple discharge, skin change or tenderness.      Left: Normal. No mass, nipple discharge, skin change or tenderness.       Abdominal:      General: Bowel sounds are normal.      Palpations: Abdomen is soft. Abdomen is not rigid. There is no mass.      Tenderness: There is no abdominal tenderness. There is no guarding.      Hernia: No hernia is present. There is no hernia in the left inguinal area.   Genitourinary:     Labia:         Right: No rash, tenderness or lesion.         Left: No rash, tenderness or lesion.       Vagina: Normal. No vaginal discharge or lesions.      Cervix: No cervical motion tenderness, discharge, lesion or cervical bleeding.      Uterus: Normal. Deviated. Not enlarged, not fixed and not tender.       Adnexa:         Right: No mass or tenderness.          Left: No mass or tenderness.        Rectum: No external hemorrhoid.      Comments: Retroverted , no mass   Musculoskeletal:      Cervical back: Normal range of motion. No muscular tenderness.   Neurological:      Mental Status: She is alert and oriented to person, place, and time.   Psychiatric:         Behavior: Behavior normal.            Assessment and Plan    Problem List Items Addressed This Visit    None     Visit Diagnoses     Pap test, as part of routine gynecological examination    -  Primary    Relevant Orders    Pap IG, Rfx HPV ASCU      S/P ablation ansd had vaginal fibroids     1. GYN annual well woman exam.   2. Reviewed monthly self breast exams.  Instructed to call with lumps, pain, or breast discharge.  Yearly mammograms ordered.  3. Recommended use of Vitamin D and getting adequate calcium in her diet. (1500mg)  4. Osteoporosis screening ordered today.  5. Reviewed exercise as a preventative health measures.   6. Reviewed BMI and weight loss as  preventative health measures.   7. Colonoscopy recommended.  8. RTC in 1 year or PRN with problems.  9. Other: Vaginal cyst is gone    10. Retroverted uterus with some PC pain   11. No follow-ups on file.     Lucas Cifuentes MD  04/05/2022

## 2022-04-15 DIAGNOSIS — Z01.419 PAP TEST, AS PART OF ROUTINE GYNECOLOGICAL EXAMINATION: ICD-10-CM

## 2022-05-16 ENCOUNTER — TRANSCRIBE ORDERS (OUTPATIENT)
Dept: ADMINISTRATIVE | Facility: HOSPITAL | Age: 52
End: 2022-05-16

## 2022-05-16 DIAGNOSIS — Z12.31 VISIT FOR SCREENING MAMMOGRAM: Primary | ICD-10-CM

## 2022-06-14 ENCOUNTER — APPOINTMENT (OUTPATIENT)
Dept: MAMMOGRAPHY | Facility: HOSPITAL | Age: 52
End: 2022-06-14

## 2022-08-15 ENCOUNTER — APPOINTMENT (OUTPATIENT)
Dept: MAMMOGRAPHY | Facility: HOSPITAL | Age: 52
End: 2022-08-15

## 2023-04-06 ENCOUNTER — OFFICE VISIT (OUTPATIENT)
Dept: OBSTETRICS AND GYNECOLOGY | Facility: CLINIC | Age: 53
End: 2023-04-06
Payer: COMMERCIAL

## 2023-04-06 VITALS — DIASTOLIC BLOOD PRESSURE: 72 MMHG | WEIGHT: 146.2 LBS | SYSTOLIC BLOOD PRESSURE: 110 MMHG | BODY MASS INDEX: 24.33 KG/M2

## 2023-04-06 DIAGNOSIS — Z01.419 PAP TEST, AS PART OF ROUTINE GYNECOLOGICAL EXAMINATION: Primary | ICD-10-CM

## 2023-04-06 DIAGNOSIS — Z53.21 PATIENT LEFT WITHOUT BEING SEEN: ICD-10-CM

## 2023-04-07 LAB — REF LAB TEST METHOD: NORMAL

## 2023-11-28 ENCOUNTER — OFFICE VISIT (OUTPATIENT)
Dept: OBSTETRICS AND GYNECOLOGY | Facility: CLINIC | Age: 53
End: 2023-11-28
Payer: COMMERCIAL

## 2023-11-28 ENCOUNTER — TELEPHONE (OUTPATIENT)
Dept: OBSTETRICS AND GYNECOLOGY | Facility: CLINIC | Age: 53
End: 2023-11-28

## 2023-11-28 VITALS
BODY MASS INDEX: 24.66 KG/M2 | HEIGHT: 65 IN | WEIGHT: 148 LBS | DIASTOLIC BLOOD PRESSURE: 78 MMHG | SYSTOLIC BLOOD PRESSURE: 120 MMHG

## 2023-11-28 DIAGNOSIS — N61.0 BREAST INFLAMMATION: Primary | ICD-10-CM

## 2023-11-28 PROCEDURE — 99213 OFFICE O/P EST LOW 20 MIN: CPT | Performed by: OBSTETRICS & GYNECOLOGY

## 2023-11-28 RX ORDER — SULFAMETHOXAZOLE AND TRIMETHOPRIM 800; 160 MG/1; MG/1
1 TABLET ORAL 2 TIMES DAILY
Start: 2023-11-28

## 2023-11-28 RX ORDER — CEPHALEXIN 500 MG/1
500 CAPSULE ORAL 4 TIMES DAILY
Start: 2023-11-28 | End: 2023-12-08

## 2023-11-28 RX ORDER — FLUCONAZOLE 150 MG/1
TABLET ORAL
Qty: 8 TABLET | Refills: 1 | Status: SHIPPED | OUTPATIENT
Start: 2023-11-28

## 2023-11-28 NOTE — TELEPHONE ENCOUNTER
Caller: Kailey Young    Relationship: Self    Best call back number: [phone]    What orders are you requesting (i.e. lab or imaging): MAMMOGRAM    In what timeframe would the patient need to come in: ASAP    Where will you receive your [facility]imaging services[facility]     Additional notes: PT WOULD LIKE TO GET AN ORDER FOR HER MAMMOGRAM - PLEASE CALL PT IF ANY QUESTIONS OR TO ADVISE

## 2023-11-28 NOTE — TELEPHONE ENCOUNTER
Had diagnostic mammogram 4/27/2021; recommendation was to return to screening mammograms. A screening mammogram was ordered 4/2022 but has not had it done. Asking for a new order. Reports has had a rash on her right breast for past 6 weeks: red bumpy, itchy rash. Cortisone cream helped itching but rash did not resolve. She saw her PCP 11/22/23; was told it was a staph infection and given Keflex & Bactrim. States rash has not improved and she noted thickening of the nipple. She had made an appointment with Dr. Cifuentes this afternoon but canceled it because it looked a little better yesterday. States the rash fluctuates from day to day. Rescheduled her appointment for this afternoon. She v/u and agreed.

## 2023-11-28 NOTE — PROGRESS NOTES
OBGYN Office Note      Chief Complaint   Patient presents with   • Breast Problem        Subjective     HPI  Kailey Young is a 53 y.o. female, , who presents with breast complaints of a  rash, itching, swelling, thickened nipple of R breast .  This is present in  entirety  of right breast(s).    She states she has experienced this problem for 6 weeks. The problem has remained unchanged since it began. The patient denies breast lump and nipple discharge. She has had a mammogram before. She does have a family history of breast CA in her paternal aunt.     Patient states symptoms have worsened and improved intermittently over the past 6 weeks. Patient saw dermatology last tues and pcp on Wednesday. PCP treated her for possible staph infection with keflex and bactrim. States rash seem to be somewhat better today but symptoms seem to change 'day to day.' She is also using OTC cortisone cream, states was given 2% cortisone cream by dermatology but caused burning at site so she stopped.     Last mammogram -R dx with bilateral u/s-benign.    Her last LMP was No LMP recorded. Patient has had an ablation..      Current contraception: contraceptive methods: Vasectomy     Last mammogram:   Last Completed Mammogram       This patient has no relevant Health Maintenance data.          Tobacco Usage?: No     Past Medical History:   Diagnosis Date   • Anemia    • Colon polyps    • Depression    • Fibroids    • Hx of colonic polyps    • Hypertension    • IBS (irritable bowel syndrome)    • Ulcerative colitis        Past Surgical History:   Procedure Laterality Date   • ADENOIDECTOMY     • BREAST BIOPSY Bilateral    • BREAST EXCISIONAL BIOPSY Left     BENIGN   • DIAGNOSTIC LAPAROSCOPY     • EAR TUBES     • ENDOMETRIAL ABLATION     • ESOPHAGEAL MOTILITY STUDY N/A 2019    Procedure: MANOMETRY;  Surgeon: Anup Fletcher MD;  Location: [facility] ENDOSCOPY;  Service: Gastroenterology   • FRACTURE  SURGERY      arm   • GASTRIC PH MONITORING 24HR  08/08/2019    Procedure: GASTRIC PH MONITORING 24HR;  Surgeon: Anup Fletcher MD;  Location: [facility] ENDOSCOPY;  Service: Gastroenterology   • VAGINAL MASS EXCISION  06/2021    Dr. Hernandez, benign fibroid   • WISDOM TOOTH EXTRACTION         Family History   Problem Relation Age of Onset   • Hypertension Father    • Liver cancer Father    • Arthritis Mother    • Osteoporosis Mother    • Thyroid disease Mother    • Colon cancer Maternal Grandfather    • Colon cancer Maternal Uncle    • Colon cancer Maternal Uncle    • Breast cancer Paternal Aunt    • Ovarian cancer Neg Hx    • Uterine cancer Neg Hx    • Melanoma Neg Hx    • Prostate cancer Neg Hx    • Deep vein thrombosis Neg Hx    • Pulmonary embolism Neg Hx    • Coronary artery disease Neg Hx    • Stroke Neg Hx    • Diabetes Neg Hx           Current Outpatient Medications:   •  ALPRAZolam (XANAX) 1 MG tablet, , Disp: , Rfl:   •  bisoprolol (ZEBeta) 10 MG tablet, , Disp: , Rfl:   •  Chlorcyclizine-Pseudoephed (STAHIST AD PO), , Disp: , Rfl:   •  Dexilant 60 MG capsule, , Disp: , Rfl:   •  FLUoxetine (PROzac) 20 MG tablet, , Disp: , Rfl:   •  lamoTRIgine (LaMICtal) 200 MG tablet, , Disp: , Rfl:   •  mesalamine (LIALDA) 1.2 g EC tablet, , Disp: , Rfl:   •  cephalexin (Keflex) 500 MG capsule, Take 1 capsule by mouth 4 (Four) Times a Day for 10 days., Disp: , Rfl:   •  fluconazole (DIFLUCAN) 150 MG tablet, Take one tablet now and repeat q 2 days x 2 weeks, Disp: 8 tablet, Rfl: 1  •  sulfamethoxazole-trimethoprim (Bactrim DS) 800-160 MG per tablet, Take 1 tablet by mouth 2 (Two) Times a Day., Disp: , Rfl:      Review of Systems   Constitutional: Negative.    HENT: Negative.     Respiratory: Negative.     Cardiovascular: Negative.    Gastrointestinal: Negative.    Genitourinary:         Breast rash, pain, swelling, thickened skin   Musculoskeletal: Negative.    Skin: Negative.    Allergic/Immunologic: Negative.   "  Neurological: Negative.    Hematological: Negative.    Psychiatric/Behavioral: Negative.         I have reviewed and agree with the HPI, ROS, and historical information as entered above. Lucas Cifuentes MD      Objective   /78   Ht 165.1 cm (65\")   Wt 67.1 kg (148 lb)   BMI 24.63 kg/m²     Physical Exam  Chest:          Comments: No mass and she has skn rash resembles yeast    Poss bacterial infection       Assessment & Plan     Assessment     Problem List Items Addressed This Visit    None  Visit Diagnoses       Breast inflammation    -  Primary    Relevant Orders    Mammo Diagnostic Digital Tomosynthesis Bilateral With CAD    Ambulatory Referral to General Surgery (Completed)        Poss yeast as has had abs x 7 days and we will do dx mamm and recheck       Plan    Discussed mastitis and its treatment.  Reassured the patient that the finding is most likely benign.  Discussed need for futher evaluation.  Arranged for mammogram.  Diflucan   Will rx Diflucan 150 qo day x 2 weeks and do dx mamm and see her in 2 weeks , see surgeon poss pagets   Return in about 2 weeks (around 12/12/2023) for Recheck.      Lucas Cifuentes MD  11/28/2023    "

## 2023-12-14 ENCOUNTER — OFFICE VISIT (OUTPATIENT)
Dept: OBSTETRICS AND GYNECOLOGY | Facility: CLINIC | Age: 53
End: 2023-12-14
Payer: COMMERCIAL

## 2023-12-14 VITALS
DIASTOLIC BLOOD PRESSURE: 82 MMHG | HEIGHT: 65 IN | BODY MASS INDEX: 24.83 KG/M2 | SYSTOLIC BLOOD PRESSURE: 138 MMHG | WEIGHT: 149 LBS

## 2023-12-14 DIAGNOSIS — N61.0 BREAST INFLAMMATION: Primary | ICD-10-CM

## 2023-12-14 PROCEDURE — 99213 OFFICE O/P EST LOW 20 MIN: CPT | Performed by: OBSTETRICS & GYNECOLOGY

## 2023-12-14 RX ORDER — CLOTRIMAZOLE AND BETAMETHASONE DIPROPIONATE 10; .64 MG/G; MG/G
1 CREAM TOPICAL 3 TIMES DAILY
Qty: 15 G | Refills: 2 | Status: SHIPPED | OUTPATIENT
Start: 2023-12-14

## 2023-12-14 NOTE — PROGRESS NOTES
Chief Complaint   Patient presents with    Follow-up     Breast exam       Subjective   HPI  Kailey Young is a 53 y.o. female, . Her last LMP was No LMP recorded. Patient has had an ablation.. who presents for follow up on breast pain, rash, swelling, itching.      At her last visit she was treated with Diflucan. Since then she reports her symptoms/issue has worsened . The patient reports worsening rash on R breast since last OV 2 weeks ago. She is also c/o pain/swelling/itching at site. States no change with diflucan QD x2 weeks.     Patient was previously treated with 2 round of antibiotics with pcp. She is scheduled to have dx mammogram next tues and OV with surgeon on Wednesday.     Additional OB/GYN History     Last Pap :   Last Completed Pap Smear            PAP SMEAR (Yearly) Next due on 2023  LIQUID-BASED PAP SMEAR WITH HPV GENOTYPING IF ASCUS ([facility])    2022  SCANNED - PAP SMEAR    2021  SCANNED - PAP SMEAR    2021  SCANNED - PAP SMEAR    2021  Done - negative    Only the first 5 history entries have been loaded, but more history exists.                    Last mammogram:   Last Completed Mammogram       This patient has no relevant Health Maintenance data.            Tobacco Usage?: No   OB History          1    Para   1    Term   1            AB        Living   1         SAB        IAB        Ectopic        Molar        Multiple        Live Births                      Current Outpatient Medications:     ALPRAZolam (XANAX) 1 MG tablet, , Disp: , Rfl:     bisoprolol (ZEBeta) 10 MG tablet, , Disp: , Rfl:     Chlorcyclizine-Pseudoephed (STAHIST AD PO), , Disp: , Rfl:     FLUoxetine (PROzac) 20 MG tablet, , Disp: , Rfl:     lamoTRIgine (LaMICtal) 200 MG tablet, , Disp: , Rfl:     mesalamine (LIALDA) 1.2 g EC tablet, , Disp: , Rfl:     clotrimazole-betamethasone (Lotrisone) 1-0.05 % cream, Apply 1 application  topically  to the appropriate area as directed 3 (Three) Times a Day., Disp: 15 g, Rfl: 2    Dexilant 60 MG capsule, , Disp: , Rfl:     fluconazole (DIFLUCAN) 150 MG tablet, Take one tablet now and repeat q 2 days x 2 weeks (Patient not taking: Reported on 12/14/2023), Disp: 8 tablet, Rfl: 1    sulfamethoxazole-trimethoprim (Bactrim DS) 800-160 MG per tablet, Take 1 tablet by mouth 2 (Two) Times a Day. (Patient not taking: Reported on 12/14/2023), Disp: , Rfl:      Past Medical History:   Diagnosis Date    Anemia     Colon polyps     Depression     Fibroids     Hx of colonic polyps     Hypertension     IBS (irritable bowel syndrome)     Ulcerative colitis         Past Surgical History:   Procedure Laterality Date    ADENOIDECTOMY      BREAST BIOPSY Bilateral 2016    BREAST EXCISIONAL BIOPSY Left 2016    BENIGN    DIAGNOSTIC LAPAROSCOPY      EAR TUBES      ENDOMETRIAL ABLATION      ESOPHAGEAL MOTILITY STUDY N/A 08/08/2019    Procedure: MANOMETRY;  Surgeon: Anup Fletcher MD;  Location:  [facility];  Service: Gastroenterology    FRACTURE SURGERY      arm    GASTRIC PH MONITORING 24HR  08/08/2019    Procedure: GASTRIC PH MONITORING 24HR;  Surgeon: Anup Fletcher MD;  Location:  [facility];  Service: Gastroenterology    VAGINAL MASS EXCISION  06/2021    Dr. Hernandez, benign fibroid    WISDOM TOOTH EXTRACTION         The additional following portions of the patient's history were reviewed and updated as appropriate: allergies and current medications.    Review of Systems   Constitutional: Negative.    HENT: Negative.     Respiratory: Negative.     Cardiovascular: Negative.    Gastrointestinal: Negative.    Genitourinary: Negative.  Positive for breast pain.   Musculoskeletal: Negative.    Skin: Negative.    Allergic/Immunologic: Negative.    Neurological: Negative.    Hematological: Negative.    Psychiatric/Behavioral: Negative.         I have reviewed and agree with the HPI, ROS, and historical information as  "entered above. Lucas Cifuentes MD      Objective   /82   Ht 165.1 cm (65\")   Wt 67.6 kg (149 lb)   BMI 24.79 kg/m²     Physical Exam  Chest:             Assessment & Plan     Assessment     Problem List Items Addressed This Visit    None  Visit Diagnoses       Breast inflammation    -  Primary    Relevant Medications    clotrimazole-betamethasone (Lotrisone) 1-0.05 % cream            S/p and antibiotics and diflucan and rash like areas have increased and no Left breast invoved     Plan     All questions answered.  Will try Lotrisone cream and she has mamm and breast surgeon appt .   No follow-ups on file.  I do not think is breast tissue origin         Lucas Cifuentes MD  12/14/2023    "

## 2023-12-15 ENCOUNTER — TELEPHONE (OUTPATIENT)
Dept: OBSTETRICS AND GYNECOLOGY | Facility: CLINIC | Age: 53
End: 2023-12-15
Payer: COMMERCIAL

## 2023-12-15 LAB
25(OH)D3+25(OH)D2 SERPL-MCNC: 18.3 NG/ML (ref 30–100)
ALBUMIN SERPL-MCNC: 4.1 G/DL (ref 3.8–4.9)
ALBUMIN/GLOB SERPL: 1.5 {RATIO} (ref 1.2–2.2)
ALP SERPL-CCNC: 78 IU/L (ref 44–121)
ALT SERPL-CCNC: 36 IU/L (ref 0–32)
ANA SER QL: POSITIVE
AST SERPL-CCNC: 28 IU/L (ref 0–40)
BASOPHILS # BLD AUTO: 0.1 X10E3/UL (ref 0–0.2)
BASOPHILS NFR BLD AUTO: 1 %
BILIRUB SERPL-MCNC: <0.2 MG/DL (ref 0–1.2)
BUN SERPL-MCNC: 12 MG/DL (ref 6–24)
BUN/CREAT SERPL: 14 (ref 9–23)
CALCIUM SERPL-MCNC: 9.2 MG/DL (ref 8.7–10.2)
CHLORIDE SERPL-SCNC: 101 MMOL/L (ref 96–106)
CHOLEST SERPL-MCNC: 255 MG/DL (ref 100–199)
CO2 SERPL-SCNC: 24 MMOL/L (ref 20–29)
CREAT SERPL-MCNC: 0.87 MG/DL (ref 0.57–1)
DSDNA AB SER-ACNC: 1 IU/ML (ref 0–9)
EGFRCR SERPLBLD CKD-EPI 2021: 81 ML/MIN/1.73
EOSINOPHIL # BLD AUTO: 0.4 X10E3/UL (ref 0–0.4)
EOSINOPHIL NFR BLD AUTO: 6 %
ERYTHROCYTE [DISTWIDTH] IN BLOOD BY AUTOMATED COUNT: 13.1 % (ref 11.7–15.4)
ESTRADIOL SERPL-MCNC: 382 PG/ML
FSH SERPL-ACNC: 7.9 MIU/ML
GLOBULIN SER CALC-MCNC: 2.7 G/DL (ref 1.5–4.5)
GLUCOSE SERPL-MCNC: 92 MG/DL (ref 70–99)
HBA1C MFR BLD: 5.5 % (ref 4.8–5.6)
HCT VFR BLD AUTO: 37.5 % (ref 34–46.6)
HDLC SERPL-MCNC: 60 MG/DL
HGB BLD-MCNC: 12.5 G/DL (ref 11.1–15.9)
IMM GRANULOCYTES # BLD AUTO: 0 X10E3/UL (ref 0–0.1)
IMM GRANULOCYTES NFR BLD AUTO: 0 %
LDLC SERPL CALC-MCNC: 168 MG/DL (ref 0–99)
LYMPHOCYTES # BLD AUTO: 2.1 X10E3/UL (ref 0.7–3.1)
LYMPHOCYTES NFR BLD AUTO: 29 %
Lab: NORMAL
MCH RBC QN AUTO: 30.3 PG (ref 26.6–33)
MCHC RBC AUTO-ENTMCNC: 33.3 G/DL (ref 31.5–35.7)
MCV RBC AUTO: 91 FL (ref 79–97)
MONOCYTES # BLD AUTO: 0.4 X10E3/UL (ref 0.1–0.9)
MONOCYTES NFR BLD AUTO: 6 %
NEUTROPHILS # BLD AUTO: 4.3 X10E3/UL (ref 1.4–7)
NEUTROPHILS NFR BLD AUTO: 58 %
PLATELET # BLD AUTO: 262 X10E3/UL (ref 150–450)
POTASSIUM SERPL-SCNC: 4 MMOL/L (ref 3.5–5.2)
PROT SERPL-MCNC: 6.8 G/DL (ref 6–8.5)
RBC # BLD AUTO: 4.13 X10E6/UL (ref 3.77–5.28)
RHEUMATOID FACT SERPL-ACNC: <10 IU/ML
SODIUM SERPL-SCNC: 138 MMOL/L (ref 134–144)
T4 FREE SERPL-MCNC: 1.13 NG/DL (ref 0.82–1.77)
TRIGL SERPL-MCNC: 152 MG/DL (ref 0–149)
TSH SERPL DL<=0.005 MIU/L-ACNC: 2.51 UIU/ML (ref 0.45–4.5)
VLDLC SERPL CALC-MCNC: 27 MG/DL (ref 5–40)
WBC # BLD AUTO: 7.3 X10E3/UL (ref 3.4–10.8)

## 2023-12-15 NOTE — TELEPHONE ENCOUNTER
Pt wanted to check if the lostrisone was the only medication that was supposed to be called in. I let her know this is all OP put in his note but if it doesn't seem to help to call and let us know. She VU

## 2023-12-19 ENCOUNTER — HOSPITAL ENCOUNTER (OUTPATIENT)
Dept: MAMMOGRAPHY | Facility: HOSPITAL | Age: 53
Discharge: HOME | End: 2023-12-19
Payer: COMMERCIAL

## 2023-12-19 ENCOUNTER — TRANSCRIBE ORDERS (OUTPATIENT)
Dept: ADMINISTRATIVE | Facility: HOSPITAL | Age: 53
End: 2023-12-19
Payer: COMMERCIAL

## 2023-12-19 ENCOUNTER — HOSPITAL ENCOUNTER (OUTPATIENT)
Dept: ULTRASOUND IMAGING | Facility: HOSPITAL | Age: 53
Discharge: HOME | End: 2023-12-19
Payer: COMMERCIAL

## 2023-12-19 DIAGNOSIS — N61.0 BREAST INFLAMMATION: ICD-10-CM

## 2023-12-19 DIAGNOSIS — R76.8 POSITIVE ANA (ANTINUCLEAR ANTIBODY): Primary | ICD-10-CM

## 2023-12-19 DIAGNOSIS — R92.8 ABNORMAL MAMMOGRAM: Primary | ICD-10-CM

## 2023-12-19 PROCEDURE — G0279 TOMOSYNTHESIS, MAMMO: HCPCS

## 2023-12-19 PROCEDURE — 76642 ULTRASOUND BREAST LIMITED: CPT | Performed by: RADIOLOGY

## 2023-12-19 PROCEDURE — 77066 DX MAMMO INCL CAD BI: CPT | Performed by: RADIOLOGY

## 2023-12-19 PROCEDURE — 77066 DX MAMMO INCL CAD BI: CPT

## 2023-12-19 PROCEDURE — 76642 ULTRASOUND BREAST LIMITED: CPT

## 2023-12-19 PROCEDURE — 77062 BREAST TOMOSYNTHESIS BI: CPT | Performed by: RADIOLOGY

## 2023-12-21 ENCOUNTER — TELEPHONE (OUTPATIENT)
Dept: MAMMOGRAPHY | Facility: HOSPITAL | Age: 53
End: 2023-12-21
Payer: COMMERCIAL

## 2023-12-21 NOTE — TELEPHONE ENCOUNTER
Patient called in requesting a sooner biopsy appointment.  She stated she saw Dr. Lou yesterday.  Dian Price RT and Dr. Degroot made aware of patient's request.

## 2023-12-22 LAB
APTT HEX PL PPP: 3 SEC
APTT IMM NP PPP: NORMAL SEC
APTT PPP 1:1 SALINE: NORMAL SEC
APTT PPP: 27 SEC
B2 GLYCOPROT1 IGA SER-ACNC: NORMAL
B2 GLYCOPROT1 IGG SER-ACNC: NORMAL
B2 GLYCOPROT1 IGM SER-ACNC: NORMAL
CARDIOLIPIN IGG SER IA-ACNC: NORMAL
CARDIOLIPIN IGM SER IA-ACNC: NORMAL
CONFIRM APTT: 0.1 SEC
CONFIRM DRVVT: NORMAL SEC
DRVVT SCREEN TO CONFIRM RATIO: NORMAL RATIO
INR PPP: 1 RATIO
LABORATORY COMMENT REPORT: NORMAL
PROTHROMBIN TIME: 11 SEC
SCREEN DRVVT: 32.2 SEC
THROMBIN TIME: 20.1 SEC

## 2023-12-26 ENCOUNTER — HOSPITAL ENCOUNTER (OUTPATIENT)
Dept: MAMMOGRAPHY | Facility: HOSPITAL | Age: 53
Discharge: HOME | End: 2023-12-26
Payer: COMMERCIAL

## 2023-12-26 ENCOUNTER — HOSPITAL ENCOUNTER (OUTPATIENT)
Dept: ULTRASOUND IMAGING | Facility: HOSPITAL | Age: 53
Discharge: HOME | End: 2023-12-26
Payer: COMMERCIAL

## 2023-12-26 DIAGNOSIS — R92.8 ABNORMAL MAMMOGRAM: ICD-10-CM

## 2023-12-26 PROCEDURE — 88305 TISSUE EXAM BY PATHOLOGIST: CPT | Performed by: RADIOLOGY

## 2023-12-26 PROCEDURE — A4648 IMPLANTABLE TISSUE MARKER: HCPCS

## 2023-12-26 PROCEDURE — 25010000002 LIDOCAINE 1 % SOLUTION: Performed by: RADIOLOGY

## 2023-12-26 PROCEDURE — 76942 ECHO GUIDE FOR BIOPSY: CPT

## 2023-12-26 RX ORDER — LIDOCAINE HYDROCHLORIDE AND EPINEPHRINE 10; 10 MG/ML; UG/ML
10 INJECTION, SOLUTION INFILTRATION; PERINEURAL ONCE
Status: COMPLETED | OUTPATIENT
Start: 2023-12-26 | End: 2023-12-26

## 2023-12-26 RX ORDER — LIDOCAINE HYDROCHLORIDE 10 MG/ML
10 INJECTION, SOLUTION INFILTRATION; PERINEURAL ONCE
Status: COMPLETED | OUTPATIENT
Start: 2023-12-26 | End: 2023-12-26

## 2023-12-26 RX ADMIN — LIDOCAINE HYDROCHLORIDE,EPINEPHRINE BITARTRATE 3 ML: 10; .01 INJECTION, SOLUTION INFILTRATION; PERINEURAL at 10:19

## 2023-12-26 RX ADMIN — LIDOCAINE HYDROCHLORIDE 10 ML: 10 INJECTION, SOLUTION INFILTRATION; PERINEURAL at 10:25

## 2023-12-26 NOTE — PROGRESS NOTES
Alert and orientated. Denies discomfort, no active bleeding, steri-strips not visualized, gauze dressing intact. Patient requests Dr TAY Lou for surgical consult if needed.  Cold packs given. Verbalizes and demonstrates understanding of post-care instructions, written copy given.

## 2023-12-27 ENCOUNTER — TELEPHONE (OUTPATIENT)
Dept: MAMMOGRAPHY | Facility: HOSPITAL | Age: 53
End: 2023-12-27
Payer: COMMERCIAL

## 2023-12-27 LAB
CYTO UR: NORMAL
LAB AP CASE REPORT: NORMAL
LAB AP CLINICAL INFORMATION: NORMAL
LAB AP DIAGNOSIS COMMENT: NORMAL
PATH REPORT.FINAL DX SPEC: NORMAL
PATH REPORT.GROSS SPEC: NORMAL
REF LAB TEST METHOD: NORMAL

## 2023-12-27 NOTE — TELEPHONE ENCOUNTER
Patient notified of pathology and cytology results and recommendation. Verbalizes understanding. Denies discomfort. Denies signs and symptoms of infection. Questions answered, support given, verbalized understanding.

## 2023-12-28 ENCOUNTER — TELEPHONE (OUTPATIENT)
Dept: OBSTETRICS AND GYNECOLOGY | Facility: CLINIC | Age: 53
End: 2023-12-28
Payer: COMMERCIAL

## 2023-12-28 NOTE — TELEPHONE ENCOUNTER
[facility] IS REQUESTING TO HAVE LAB RESULTS FAXED OVER TO THEM.    [phone]    CALL BACK [phone]

## 2024-01-10 ENCOUNTER — TELEPHONE (OUTPATIENT)
Dept: OBSTETRICS AND GYNECOLOGY | Facility: CLINIC | Age: 54
End: 2024-01-10

## 2024-01-10 NOTE — TELEPHONE ENCOUNTER
Caller: Hector Kaileylucero Powers    Relationship: Self    Best call back number: [phone] / LVM    Additional notes: PT WAS REFERRED TO [facility]    Rheumatology - PT CALLED THAT OFFICE TO MAKE AN APPT AND WAS TOLD THAT THEY NEVER REC'D A REFERRAL FOR THE PT OR ANY MED RECS - PLEASE CALL THE PT TO LET HER KNOW ONCE THIS REQUEST CAN BE MADE SO THAT THE PT CAN MAKE AN APPT W/ DR TILLMAN    THANK YOU!

## 2024-03-12 ENCOUNTER — TRANSCRIBE ORDERS (OUTPATIENT)
Dept: ADMINISTRATIVE | Facility: HOSPITAL | Age: 54
End: 2024-03-12
Payer: COMMERCIAL

## 2024-03-12 DIAGNOSIS — R93.89 ABNORMAL FINDINGS ON IMAGING TEST: Primary | ICD-10-CM

## 2024-06-06 ENCOUNTER — TRANSCRIBE ORDERS (OUTPATIENT)
Dept: MAMMOGRAPHY | Facility: HOSPITAL | Age: 54
End: 2024-06-06
Payer: COMMERCIAL

## 2024-06-06 DIAGNOSIS — R92.8 ABNORMAL MAMMOGRAM: Primary | ICD-10-CM

## 2024-07-16 ENCOUNTER — HOSPITAL ENCOUNTER (OUTPATIENT)
Dept: ULTRASOUND IMAGING | Facility: HOSPITAL | Age: 54
Discharge: HOME | End: 2024-07-16
Payer: COMMERCIAL

## 2024-07-16 ENCOUNTER — HOSPITAL ENCOUNTER (OUTPATIENT)
Dept: MAMMOGRAPHY | Facility: HOSPITAL | Age: 54
Discharge: HOME | End: 2024-07-16
Payer: COMMERCIAL

## 2024-07-16 ENCOUNTER — TRANSCRIBE ORDERS (OUTPATIENT)
Dept: ADMINISTRATIVE | Facility: HOSPITAL | Age: 54
End: 2024-07-16
Payer: COMMERCIAL

## 2024-07-16 DIAGNOSIS — R92.8 ABNORMAL MAMMOGRAM: ICD-10-CM

## 2024-07-16 DIAGNOSIS — R92.8 ABNORMAL MAMMOGRAM: Primary | ICD-10-CM

## 2024-07-16 PROCEDURE — 77066 DX MAMMO INCL CAD BI: CPT

## 2024-07-16 PROCEDURE — G0279 TOMOSYNTHESIS, MAMMO: HCPCS

## 2024-07-16 PROCEDURE — 76642 ULTRASOUND BREAST LIMITED: CPT

## 2024-07-29 ENCOUNTER — HOSPITAL ENCOUNTER (OUTPATIENT)
Facility: HOSPITAL | Age: 54
Discharge: HOME | End: 2024-07-29
Payer: COMMERCIAL

## 2024-07-29 DIAGNOSIS — R92.8 ABNORMAL MAMMOGRAM: ICD-10-CM

## 2024-07-29 PROCEDURE — A4648 IMPLANTABLE TISSUE MARKER: HCPCS

## 2024-07-29 PROCEDURE — 25010000002 LIDOCAINE 1 % SOLUTION: Performed by: RADIOLOGY

## 2024-07-29 PROCEDURE — 88305 TISSUE EXAM BY PATHOLOGIST: CPT | Performed by: OBSTETRICS & GYNECOLOGY

## 2024-07-29 RX ORDER — LIDOCAINE HYDROCHLORIDE 10 MG/ML
5 INJECTION, SOLUTION INFILTRATION; PERINEURAL ONCE
Status: COMPLETED | OUTPATIENT
Start: 2024-07-29 | End: 2024-07-29

## 2024-07-29 RX ORDER — LIDOCAINE HYDROCHLORIDE AND EPINEPHRINE 10; 10 MG/ML; UG/ML
10 INJECTION, SOLUTION INFILTRATION; PERINEURAL ONCE
Status: COMPLETED | OUTPATIENT
Start: 2024-07-29 | End: 2024-07-29

## 2024-07-29 RX ADMIN — LIDOCAINE HYDROCHLORIDE 4 ML: 10 INJECTION, SOLUTION INFILTRATION; PERINEURAL at 15:39

## 2024-07-29 RX ADMIN — LIDOCAINE HYDROCHLORIDE,EPINEPHRINE BITARTRATE 10 ML: 10; .01 INJECTION, SOLUTION INFILTRATION; PERINEURAL at 15:39

## 2024-07-29 NOTE — PROGRESS NOTES
Alert and oriented. Denies discomfort, no active bleeding, steri-strips not visualized, gauze dressing intact.  Cold packs given.  Verbalizes and demonstrates understanding of post-care instructions, written copy given. Questions answered, support given.

## 2024-07-31 LAB
CYTO UR: NORMAL
LAB AP CASE REPORT: NORMAL
LAB AP CLINICAL INFORMATION: NORMAL
PATH REPORT.FINAL DX SPEC: NORMAL
PATH REPORT.GROSS SPEC: NORMAL
REF LAB TEST METHOD: NORMAL

## 2024-08-02 ENCOUNTER — TELEPHONE (OUTPATIENT)
Dept: MAMMOGRAPHY | Facility: HOSPITAL | Age: 54
End: 2024-08-02
Payer: COMMERCIAL

## 2024-08-02 NOTE — TELEPHONE ENCOUNTER
Patient requests Dr REENTTA Modi for surgical consult. Patient will be notified when an appointment is scheduled. Verbalized understanding.

## 2024-08-02 NOTE — TELEPHONE ENCOUNTER
Patient notified of surgical consult appointment with Dr RENETTA Modi on 8.5.24 @ 10:15 with arrival time of 09:45 at [address]. Patient given office contact & location information. Patient notified to bring photo ID, insurance information, list of prescription & OTC medications. Patient may be accompanied by family member or friend for support. Patient verbalized understanding.

## 2024-08-02 NOTE — TELEPHONE ENCOUNTER
Patient notified of pathology results and recommendations. Verbalizes understanding. States having some discomfort, denies needing analgesics.  Denies signs and symptoms of infection.     Patient desires to research surgeon choice. Patient is to call back with decision; an appointment will be scheduled and then be notified. Verbalizes understanding.     Reviewed what would be discussed at surgical consult visit, including review of pathology and imaging reports; treatment options and pros/cons.  Patient verbalized understanding.

## 2024-08-05 ENCOUNTER — TRANSCRIBE ORDERS (OUTPATIENT)
Dept: ADMINISTRATIVE | Facility: HOSPITAL | Age: 54
End: 2024-08-05
Payer: COMMERCIAL

## 2024-08-05 DIAGNOSIS — N63.15 BREAST LUMP ON RIGHT SIDE AT 3 O'CLOCK POSITION: Primary | ICD-10-CM

## 2024-08-21 ENCOUNTER — TELEPHONE (OUTPATIENT)
Age: 54
End: 2024-08-21

## 2024-09-03 ENCOUNTER — PRE-ADMISSION TESTING (OUTPATIENT)
Dept: PREADMISSION TESTING | Facility: HOSPITAL | Age: 54
End: 2024-09-03
Payer: COMMERCIAL

## 2024-09-03 LAB
ANION GAP SERPL CALCULATED.3IONS-SCNC: 10 MMOL/L (ref 5–15)
BUN SERPL-MCNC: 9 MG/DL (ref 6–20)
BUN/CREAT SERPL: 10.6 (ref 7–25)
CALCIUM SPEC-SCNC: 9.1 MG/DL (ref 8.6–10.5)
CHLORIDE SERPL-SCNC: 105 MMOL/L (ref 98–107)
CO2 SERPL-SCNC: 25 MMOL/L (ref 22–29)
CREAT SERPL-MCNC: 0.85 MG/DL (ref 0.57–1)
DEPRECATED RDW RBC AUTO: 48.3 FL (ref 37–54)
EGFRCR SERPLBLD CKD-EPI 2021: 82.6 ML/MIN/1.73
ERYTHROCYTE [DISTWIDTH] IN BLOOD BY AUTOMATED COUNT: 14.2 % (ref 12.3–15.4)
GLUCOSE SERPL-MCNC: 100 MG/DL (ref 65–99)
HCT VFR BLD AUTO: 39.7 % (ref 34–46.6)
HGB BLD-MCNC: 13 G/DL (ref 12–15.9)
MCH RBC QN AUTO: 30.4 PG (ref 26.6–33)
MCHC RBC AUTO-ENTMCNC: 32.7 G/DL (ref 31.5–35.7)
MCV RBC AUTO: 93 FL (ref 79–97)
PLATELET # BLD AUTO: 238 10*3/MM3 (ref 140–450)
PMV BLD AUTO: 10.8 FL (ref 6–12)
POTASSIUM SERPL-SCNC: 3.7 MMOL/L (ref 3.5–5.2)
RBC # BLD AUTO: 4.27 10*6/MM3 (ref 3.77–5.28)
SODIUM SERPL-SCNC: 140 MMOL/L (ref 136–145)
WBC NRBC COR # BLD AUTO: 6.4 10*3/MM3 (ref 3.4–10.8)

## 2024-09-03 PROCEDURE — 36415 COLL VENOUS BLD VENIPUNCTURE: CPT

## 2024-09-03 PROCEDURE — 93005 ELECTROCARDIOGRAM TRACING: CPT

## 2024-09-03 PROCEDURE — 93010 ELECTROCARDIOGRAM REPORT: CPT | Performed by: INTERNAL MEDICINE

## 2024-09-03 PROCEDURE — 85027 COMPLETE CBC AUTOMATED: CPT

## 2024-09-03 PROCEDURE — 80048 BASIC METABOLIC PNL TOTAL CA: CPT

## 2024-09-06 ENCOUNTER — HOSPITAL ENCOUNTER (OUTPATIENT)
Dept: MAMMOGRAPHY | Facility: HOSPITAL | Age: 54
Discharge: HOME | End: 2024-09-06
Payer: COMMERCIAL

## 2024-09-06 ENCOUNTER — LAB REQUISITION (OUTPATIENT)
Dept: LAB | Facility: HOSPITAL | Age: 54
End: 2024-09-06
Payer: COMMERCIAL

## 2024-09-06 ENCOUNTER — HOSPITAL ENCOUNTER (OUTPATIENT)
Dept: ULTRASOUND IMAGING | Facility: HOSPITAL | Age: 54
Discharge: HOME | End: 2024-09-06
Payer: COMMERCIAL

## 2024-09-06 DIAGNOSIS — N63.15 BREAST LUMP ON RIGHT SIDE AT 3 O'CLOCK POSITION: ICD-10-CM

## 2024-09-06 DIAGNOSIS — N63.15 UNSPECIFIED LUMP IN THE RIGHT BREAST, OVERLAPPING QUADRANTS: ICD-10-CM

## 2024-09-06 DIAGNOSIS — N63.11 MASS OF UPPER OUTER QUADRANT OF RIGHT BREAST: ICD-10-CM

## 2024-09-06 LAB
QT INTERVAL: 402 MS
QTC INTERVAL: 414 MS

## 2024-09-06 PROCEDURE — 88307 TISSUE EXAM BY PATHOLOGIST: CPT | Performed by: SURGERY

## 2024-09-06 PROCEDURE — C1819 TISSUE LOCALIZATION-EXCISION: HCPCS

## 2024-09-06 PROCEDURE — 76642 ULTRASOUND BREAST LIMITED: CPT

## 2024-09-06 PROCEDURE — 25010000002 LIDOCAINE 1 % SOLUTION: Performed by: RADIOLOGY

## 2024-09-06 PROCEDURE — 76098 X-RAY EXAM SURGICAL SPECIMEN: CPT

## 2024-09-06 RX ORDER — LIDOCAINE HYDROCHLORIDE 10 MG/ML
5 INJECTION, SOLUTION INFILTRATION; PERINEURAL ONCE
Status: COMPLETED | OUTPATIENT
Start: 2024-09-06 | End: 2024-09-06

## 2024-09-06 RX ADMIN — Medication 0.05 ML: at 09:36

## 2024-09-20 ENCOUNTER — OFFICE VISIT (OUTPATIENT)
Dept: FAMILY MEDICINE CLINIC | Facility: CLINIC | Age: 54
End: 2024-09-20
Payer: COMMERCIAL

## 2024-09-20 VITALS
SYSTOLIC BLOOD PRESSURE: 112 MMHG | WEIGHT: 145 LBS | HEART RATE: 68 BPM | OXYGEN SATURATION: 98 % | BODY MASS INDEX: 24.16 KG/M2 | HEIGHT: 65 IN | DIASTOLIC BLOOD PRESSURE: 78 MMHG

## 2024-09-20 DIAGNOSIS — Z23 NEED FOR INFLUENZA VACCINATION: ICD-10-CM

## 2024-09-20 DIAGNOSIS — Z76.89 ENCOUNTER TO ESTABLISH CARE: Primary | ICD-10-CM

## 2024-09-20 DIAGNOSIS — F51.01 PRIMARY INSOMNIA: ICD-10-CM

## 2024-09-20 PROBLEM — R51.9 HEADACHE: Status: RESOLVED | Noted: 2024-03-13 | Resolved: 2024-09-20

## 2024-09-20 PROBLEM — R44.3 HALLUCINATIONS: Status: RESOLVED | Noted: 2024-03-13 | Resolved: 2024-09-20

## 2024-09-20 PROBLEM — R41.3 MEMORY IMPAIRMENT: Status: RESOLVED | Noted: 2024-03-13 | Resolved: 2024-09-20

## 2024-09-20 PROBLEM — R41.89 IMPAIRED COGNITION: Status: RESOLVED | Noted: 2024-03-13 | Resolved: 2024-09-20

## 2024-09-20 PROCEDURE — 90471 IMMUNIZATION ADMIN: CPT | Performed by: FAMILY MEDICINE

## 2024-09-20 PROCEDURE — 99203 OFFICE O/P NEW LOW 30 MIN: CPT | Performed by: FAMILY MEDICINE

## 2024-09-20 PROCEDURE — 90656 IIV3 VACC NO PRSV 0.5 ML IM: CPT | Performed by: FAMILY MEDICINE

## 2024-10-03 ENCOUNTER — OFFICE VISIT (OUTPATIENT)
Age: 54
End: 2024-10-03
Payer: COMMERCIAL

## 2024-10-03 VITALS
WEIGHT: 146.5 LBS | HEIGHT: 65 IN | DIASTOLIC BLOOD PRESSURE: 78 MMHG | SYSTOLIC BLOOD PRESSURE: 138 MMHG | BODY MASS INDEX: 24.41 KG/M2

## 2024-10-03 DIAGNOSIS — G56.21 CUBITAL TUNNEL SYNDROME ON RIGHT: Primary | ICD-10-CM

## 2024-10-03 DIAGNOSIS — M77.11 LATERAL EPICONDYLITIS OF RIGHT ELBOW: ICD-10-CM

## 2024-10-03 DIAGNOSIS — M25.521 RIGHT ELBOW PAIN: ICD-10-CM

## 2024-10-03 RX ORDER — METHYLPREDNISOLONE 4 MG
1 TABLET, DOSE PACK ORAL DAILY
Qty: 21 TABLET | Refills: 0 | Status: SHIPPED | OUTPATIENT
Start: 2024-10-03

## 2024-10-03 NOTE — PROGRESS NOTES
[facility]     Office Visit       Date: 10/03/2024   Patient Name: Kailey Young  MRN: [id number]  YOB: 1970    Referring Physician: Referring, Self     Chief Complaint:   Chief Complaint   Patient presents with    Right Elbow - Pain       History of Present Illness:   Kailey Young is a 54 y.o. female right-hand-dominant presents with right elbow pain of 6 months duration.  Patient reports both medial and lateral elbow pain.  She reports her medial elbow pain is worse at night and wakes her from sleep.  She reports it radiates distally into her forearm and into her small finger.  She reports occasional numbness and tingling in her right small finger as well as right hand weakness.  She also reports right lateral elbow pain is worse with activity and overuse.  She reports pain that radiates distally into her forearm.  She has tried anti-inflammatories with no improvement in her pain.  She is otherwise healthy.  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  She denies smoking.      Subjective   Review of Systems:   Review of Systems   Constitutional:  Negative for chills, fever, unexpected weight gain and unexpected weight loss.   HENT:  Negative for congestion, postnasal drip and rhinorrhea.    Eyes:  Negative for blurred vision.   Respiratory:  Negative for shortness of breath.    Cardiovascular:  Negative for leg swelling.   Gastrointestinal:  Negative for abdominal pain, nausea and vomiting.   Genitourinary:  Negative for difficulty urinating.   Musculoskeletal:  Positive for arthralgias. Negative for gait problem, joint swelling and myalgias.   Skin:  Negative for skin lesions and wound.   Neurological:  Negative for dizziness, weakness, light-headedness and numbness.   Hematological:  Does not bruise/bleed easily.   Psychiatric/Behavioral:  Negative for depressed mood.    All other systems reviewed and are  "negative.       Pertinent review of systems per HPI.     I reviewed the patient's chief complaint, history of present illness, review of systems, past medical history, surgical history, family history, social history, medications and allergy list in the EMR on 10/03/2024 and agree with the findings above.    Objective    Vital Signs:   Vitals:    10/03/24 1523   BP: 138/78   Weight: 66.5 kg (146 lb 8 oz)   Height: 165.1 cm (65\")     BMI: Body mass index is 24.38 kg/m².    General Appearance: No acute distress. Alert and oriented.     Chest:  Non-labored breathing on room air. Regular rate and rhythm.    Upper Extremity Exam:    Positive Tinel at the right cubital tunnel.  Positive right elbow flexion compression test.  Negative Tinel at Guyon's canal.  Negative Tinel at the right carpal tunnel.  5 out of 5 strength ulnar intrinsics.  No thenar wasting.    Tender palpation over the lateral epicondyle as well as the radial tunnel.  Increased pain with resisted extension at the wrist.  Increased pain with resisted supination of the forearm.    Fingers are warm, well-perfused with appropriate capillary refill.  Palpable radial pulse.    Sensation intact to light touch in median, radial and ulnar nerve distributions.    Motor- Fires [employer], ulnar intrinsics, EPL/EDC w/ full active and passive range of motion. Strength intact.    Non-tender except for in the areas highlighted    Imaging/Studies:   Imaging Results (Last 24 Hours)       Procedure Component Value Units Date/Time    XR Elbow 3+ View Right [[id number]] Resulted: 10/03/24 1543     Updated: 10/03/24 1544    Narrative:      Left Elbow X-Ray    Indication: Pain    Views: AP, oblique and Lateral     Comparison:  None    Findings:  No fracture  No bony lesion  Normal soft tissues  Normal joint spaces    Impression: No bony abnormality left elbow                Procedures:  Procedures    Quality Measures:   ACP:   ACP discussion was deferred.    Tobacco:   Kailey Powers " Hector  reports that she has never smoked. She has never used smokeless tobacco.      Assessment / Plan    Assessment/Plan:     There are no diagnoses linked to this encounter.     Kailey Youngis a 54 y.o. female who presents with:      ICD-10-CM ICD-9-CM   1. Cubital tunnel syndrome on right  G56.21 354.2   2. Right elbow pain  M25.521 719.42   3. Lateral epicondylitis of right elbow  M77.11 726.32         Patient presents with right elbow pain due to cubital tunnel syndrome as well as lateral epicondylitis.  Discussed the pathophysiology of both cubital tunnel syndrome as well as lateral epicondylitis as well as the options for treatment.  Recommend referral to physical therapy, continue anti-inflammatories, Medrol Dosepak, cubital tunnel nighttime brace, and EMG and nerve conduction studies of the right upper extremity.  Recommend follow-up after EMG nerve conduction studies to discuss the results.    Follow Up:   Return for Follow-up after EMG/NCS.        Solomon Benitez MD  [facility]

## 2024-11-19 ENCOUNTER — TELEMEDICINE (OUTPATIENT)
Dept: FAMILY MEDICINE CLINIC | Facility: CLINIC | Age: 54
End: 2024-11-19
Payer: COMMERCIAL

## 2024-11-19 DIAGNOSIS — F51.01 PRIMARY INSOMNIA: Primary | ICD-10-CM

## 2024-11-19 PROCEDURE — 99213 OFFICE O/P EST LOW 20 MIN: CPT | Performed by: FAMILY MEDICINE

## 2024-11-19 RX ORDER — ALPRAZOLAM 1 MG/1
1 TABLET ORAL NIGHTLY PRN
Qty: 90 TABLET | Refills: 0 | Status: SHIPPED | OUTPATIENT
Start: 2024-11-19

## 2024-11-19 NOTE — PROGRESS NOTES
Video Visit E-Visit      Date: 2024   Patient Name: Kailey Young  : 1970   MRN: [id number]     Chief Complaint:  No chief complaint on file.      I have reviewed the E-Visit questionnaire and the patient's answers, my assessment and plan are listed below.     This provider is located at the [facility] (through [facility]), [address] [place] [address] using a secure [employer] Video Visit through [employer]. Patient is being seen remotely via telehealth at their home address on file in [place], and stated they are in a secure environment for this session. The patient's condition being diagnosed/treated is appropriate for telemedicine. The provider identified himself as well as his credentials. The patient, and/or patients guardian, consent to be seen remotely, and when consent is given they understand that the consent allows for patient identifiable information to be sent to a third party as needed. They may refuse to be seen remotely at any time. The electronic data is encrypted and password protected, and the patient and/or guardian has been advised of the potential risks to privacy not withstanding such measures.    You have chosen to receive care through a telehealth visit. Do you consent to use a video/audio connection for your medical care today? Yes    History of Present Illness: Kailey Young is a 54 y.o. female who is being seen today via a video visit for follow up with insomnia.  Patient states her insomnia is stable on current medication regimen.  She is on alprazolam 1 mg nightly.  She denies any ill side effects of this medication.  She states she is suffering from a cold but seems to be on the mend.      Subjective      Review of Systems:   Review of Systems   Psychiatric/Behavioral:  Negative for sleep disturbance.        I have reviewed and the following portions of the patient's history were updated as appropriate: past  family history, past medical history, past social history, past surgical history and problem list.    Medications:     Current Outpatient Medications:     ALPRAZolam (XANAX) 1 MG tablet, Take 1 tablet by mouth At Night As Needed for Anxiety., Disp: 90 tablet, Rfl: 0    B Complex Vitamins (VITAMIN B COMPLEX 100 IJ), , Disp: , Rfl:     bisoprolol (ZEBeta) 10 MG tablet, , Disp: , Rfl:     Chlorcyclizine-Pseudoephed (STAHIST AD PO), , Disp: , Rfl:     FLUoxetine (PROzac) 20 MG tablet, , Disp: , Rfl:     lamoTRIgine (LaMICtal) 200 MG tablet, , Disp: , Rfl:     mesalamine (LIALDA) 1.2 g EC tablet, , Disp: , Rfl:     methylPREDNISolone (MEDROL) 4 MG dose pack, Take 1 tablet by mouth Daily. Use as directed by package instructions, Disp: 21 tablet, Rfl: 0    Allergies:   Allergies   Allergen Reactions    Codeine Nausea And Vomiting       Objective     Physical Exam:  Vital Signs: There were no vitals filed for this visit.  There is no height or weight on file to calculate BMI.    Physical Exam  Constitutional:       Appearance: Normal appearance.   Neurological:      Mental Status: She is alert.   Psychiatric:         Mood and Affect: Mood normal.         Behavior: Behavior normal.         Thought Content: Thought content normal.         Judgment: Judgment normal.           Assessment / Plan      Assessment/Plan:   Assessment & Plan  Primary insomnia    Orders:    ALPRAZolam (XANAX) 1 MG tablet; Take 1 tablet by mouth At Night As Needed for Anxiety.         Follow Up:   Return in about 3 months (around 2/19/2025) for Recheck.    Any medications prescribed have been sent electronically to   [facility]/pharmacy #[id number] - [place], [place] - [address] [phone]  - [phone]   [address] [place] [address]  Phone: [phone] Fax: [phone]      Ben Arellano MD  [facility]   11/19/24  14:11 EST

## 2024-11-19 NOTE — ASSESSMENT & PLAN NOTE
Orders:    ALPRAZolam (XANAX) 1 MG tablet; Take 1 tablet by mouth At Night As Needed for Anxiety.

## 2024-11-25 ENCOUNTER — TELEPHONE (OUTPATIENT)
Dept: GENETICS | Facility: HOSPITAL | Age: 54
End: 2024-11-25
Payer: COMMERCIAL

## 2024-11-25 NOTE — TELEPHONE ENCOUNTER
Called pt regarding fax in genetic referral. Pt stated she did not need an appt. Will close referral at this time.

## 2025-01-28 RX ORDER — FLUOXETINE 20 MG/1
20 TABLET, FILM COATED ORAL DAILY
Qty: 90 TABLET | Refills: 1 | Status: SHIPPED | OUTPATIENT
Start: 2025-01-28

## 2025-01-28 NOTE — TELEPHONE ENCOUNTER
Caller: Kailey Young    Relationship: Self    Best call back number: [phone]   Requested Prescriptions:   Requested Prescriptions     Pending Prescriptions Disp Refills    FLUoxetine (PROzac) 20 MG tablet          Pharmacy where request should be sent: [facility]/PHARMACY #[id number] - [place], [place] - [address] - [phone]  - [phone] FX     Last office visit with prescribing clinician: 9/20/2024   Last telemedicine visit with prescribing clinician: 11/19/2024   Next office visit with prescribing clinician: Visit date not found     Additional details provided by patient: PATIENT CALLED NEEDS MEDICATION REFILL    Does the patient have less than a 3 day supply:  [] Yes  [x] No    Would you like a call back once the refill request has been completed: [x] Yes [] No    If the office needs to give you a call back, can they leave a voicemail: [x] Yes [] No    Zohra Valentine, PCT   01/28/25 10:04 EST

## 2025-02-10 ENCOUNTER — OFFICE VISIT (OUTPATIENT)
Dept: FAMILY MEDICINE CLINIC | Facility: CLINIC | Age: 55
End: 2025-02-10
Payer: COMMERCIAL

## 2025-02-10 VITALS
HEART RATE: 79 BPM | OXYGEN SATURATION: 99 % | BODY MASS INDEX: 24.32 KG/M2 | SYSTOLIC BLOOD PRESSURE: 140 MMHG | DIASTOLIC BLOOD PRESSURE: 80 MMHG | WEIGHT: 146 LBS | RESPIRATION RATE: 16 BRPM | HEIGHT: 65 IN | TEMPERATURE: 97.5 F

## 2025-02-10 DIAGNOSIS — Z13.29 THYROID DISORDER SCREEN: ICD-10-CM

## 2025-02-10 DIAGNOSIS — Z13.1 DIABETES MELLITUS SCREENING: ICD-10-CM

## 2025-02-10 DIAGNOSIS — E78.2 MIXED HYPERLIPIDEMIA: ICD-10-CM

## 2025-02-10 DIAGNOSIS — K51.30 ULCERATIVE RECTOSIGMOIDITIS WITHOUT COMPLICATION: ICD-10-CM

## 2025-02-10 DIAGNOSIS — F51.01 PRIMARY INSOMNIA: Primary | ICD-10-CM

## 2025-02-10 DIAGNOSIS — Z51.81 ENCOUNTER FOR THERAPEUTIC DRUG MONITORING: ICD-10-CM

## 2025-02-10 DIAGNOSIS — R10.84 GENERALIZED ABDOMINAL PAIN: ICD-10-CM

## 2025-02-10 LAB
AMPHET+METHAMPHET UR QL: NEGATIVE
AMPHETAMINE INTERNAL CONTROL: ABNORMAL
AMPHETAMINES UR QL: NEGATIVE
BARBITURATE INTERNAL CONTROL: ABNORMAL
BARBITURATES UR QL SCN: NEGATIVE
BENZODIAZ UR QL SCN: POSITIVE
BENZODIAZEPINE INTERNAL CONTROL: ABNORMAL
BUPRENORPHINE INTERNAL CONTROL: ABNORMAL
BUPRENORPHINE SERPL-MCNC: NEGATIVE NG/ML
CANNABINOIDS SERPL QL: NEGATIVE
COCAINE INTERNAL CONTROL: ABNORMAL
COCAINE UR QL: NEGATIVE
EXPIRATION DATE: ABNORMAL
Lab: ABNORMAL
MDMA (ECSTASY) INTERNAL CONTROL: ABNORMAL
MDMA UR QL SCN: NEGATIVE
METHADONE INTERNAL CONTROL: ABNORMAL
METHADONE UR QL SCN: NEGATIVE
METHAMPHETAMINE INTERNAL CONTROL: ABNORMAL
MORPHINE INTERNAL CONTROL: ABNORMAL
MORPHINE/OPIATES SCREEN, URINE: NEGATIVE
OXYCODONE INTERNAL CONTROL: ABNORMAL
OXYCODONE UR QL SCN: NEGATIVE
PCP UR QL SCN: NEGATIVE
PHENCYCLIDINE INTERNAL CONTROL: ABNORMAL
THC INTERNAL CONTROL: ABNORMAL

## 2025-02-10 PROCEDURE — 99214 OFFICE O/P EST MOD 30 MIN: CPT | Performed by: FAMILY MEDICINE

## 2025-02-10 RX ORDER — DEXLANSOPRAZOLE 60 MG/1
1 CAPSULE, DELAYED RELEASE ORAL DAILY
COMMUNITY
Start: 2025-01-28

## 2025-02-10 RX ORDER — FLUOXETINE 40 MG/1
80 CAPSULE ORAL DAILY
Qty: 180 CAPSULE | Refills: 3 | Status: SHIPPED | OUTPATIENT
Start: 2025-02-10

## 2025-02-10 RX ORDER — ALPRAZOLAM 1 MG/1
1 TABLET ORAL NIGHTLY PRN
Qty: 90 TABLET | Refills: 0 | Status: SHIPPED | OUTPATIENT
Start: 2025-02-10 | End: 2025-02-17 | Stop reason: SDUPTHER

## 2025-02-10 NOTE — ASSESSMENT & PLAN NOTE
Stable.  Will check CBC and CMP today.  Orders:    CBC Auto Differential; Future    Comprehensive Metabolic Panel; Future

## 2025-02-10 NOTE — ASSESSMENT & PLAN NOTE
Stable.  Will refill alprazolam.  PDMP reviewed and controlled substance agreement signed.  Orders:    ALPRAZolam (XANAX) 1 MG tablet; Take 1 tablet by mouth At Night As Needed for Anxiety.

## 2025-02-10 NOTE — PROGRESS NOTES
Follow Up Office Visit      Patient Name: Kailey Young  : 1970   MRN: [id number]     Chief Complaint:    Chief Complaint   Patient presents with    Med Refill     Xanax, Prozac should be capsules (not tabs) 20 mg QID       History of Present Illness: Kailey Young is a 54 y.o. female who is here today for follow up with med refills.  Patient is also interested in lab work.  She has a long history of intermittent abdominal pain.  Etiology has not been determined however she does have a history of ulcerative colitis.  She states she does not think she has been tested for celiac disease.  Patient states depression/anxiety and insomnia are stable.  She is needing a refill of alprazolam today.    Subjective      Review of Systems:   Review of Systems   Constitutional:  Negative for chills, diaphoresis, fatigue and fever.   HENT:  Negative for congestion, sore throat and swollen glands.    Respiratory:  Negative for cough.    Cardiovascular:  Negative for chest pain.   Gastrointestinal:  Negative for abdominal pain, nausea and vomiting.   Genitourinary:  Negative for dysuria.   Musculoskeletal:  Negative for myalgias and neck pain.   Skin:  Negative for rash.   Neurological:  Negative for weakness and numbness.       The following portions of the patient's history were reviewed and updated as appropriate: allergies, current medications, past family history, past medical history, past social history, past surgical history and problem list.    Medications:     Current Outpatient Medications:     ALPRAZolam (XANAX) 1 MG tablet, Take 1 tablet by mouth At Night As Needed for Anxiety., Disp: 90 tablet, Rfl: 0    B Complex Vitamins (VITAMIN B COMPLEX 100 IJ), , Disp: , Rfl:     bisoprolol (ZEBeta) 10 MG tablet, , Disp: , Rfl:     Chlorcyclizine-Pseudoephed (STAHIST AD PO), , Disp: , Rfl:     dexlansoprazole (DEXILANT) 60 MG capsule, Take 1 capsule by mouth Daily., Disp: , Rfl:     lamoTRIgine (LaMICtal)  "200 MG tablet, , Disp: , Rfl:     mesalamine (LIALDA) 1.2 g EC tablet, , Disp: , Rfl:     FLUoxetine (PROzac) 40 MG capsule, Take 2 capsules by mouth Daily., Disp: 180 capsule, Rfl: 3    Allergies:   Allergies   Allergen Reactions    Codeine Nausea And Vomiting       Objective     Physical Exam:  Vital Signs:   Vitals:    02/10/25 1124   BP: 140/80   BP Location: Left arm   Patient Position: Sitting   Cuff Size: Adult   Pulse: 79   Resp: 16   Temp: 97.5 °F (36.4 °C)   TempSrc: Infrared   SpO2: 99%   Weight: 66.2 kg (146 lb)   Height: 165.1 cm (65\")   PainSc: 0-No pain     Body mass index is 24.3 kg/m².   Facility age limit for growth %jayshree is 20 years.    Physical Exam  Vitals and nursing note reviewed.   Constitutional:       Appearance: Normal appearance.   Cardiovascular:      Rate and Rhythm: Normal rate and regular rhythm.   Pulmonary:      Effort: Pulmonary effort is normal.      Breath sounds: Normal breath sounds.   Neurological:      Mental Status: She is alert.         Procedures    PHQ-9 Total Score:      Assessment / Plan      Assessment/Plan:   Assessment & Plan  Primary insomnia  Stable.  Will refill alprazolam.  PDMP reviewed and controlled substance agreement signed.  Orders:    ALPRAZolam (XANAX) 1 MG tablet; Take 1 tablet by mouth At Night As Needed for Anxiety.    Encounter for therapeutic drug monitoring  Drug screen is consistent with management.  No concerns.  Orders:    POC 12 Panel Urine Drug Screen    Generalized abdominal pain  Etiology unclear.  IBS?  Celiac?.  Will check celiac panel.  Orders:    Celiac Panel Reflex To Titer; Future    Ulcerative rectosigmoiditis without complication  Stable.  Will check CBC and CMP today.  Orders:    CBC Auto Differential; Future    Comprehensive Metabolic Panel; Future    Mixed hyperlipidemia  History of abnormal lipid profile in 2023.  Will repeat lipid panel.    Orders:    Lipid Panel; Future    Diabetes mellitus screening    Orders:    Hemoglobin " A1c; Future    Thyroid disorder screen    Orders:    TSH; Future           BMI is within normal parameters. No other follow-up for BMI required.      Follow Up:   Return in about 3 months (around 5/10/2025) for Video visit.      KATY Arellano MD  [facility]  Answers submitted by the patient for this visit:  Problem not listed (Submitted on 2/9/2025)  Chief Complaint: Other medical problem  Reason for appointment: Check up med refill  anorexia: No  joint pain: No  change in stool: No  headaches: No  joint swelling: No  vertigo: No  visual change: No  Additional information: This is a follow up visit

## 2025-02-14 ENCOUNTER — LAB (OUTPATIENT)
Dept: FAMILY MEDICINE CLINIC | Facility: CLINIC | Age: 55
End: 2025-02-14
Payer: COMMERCIAL

## 2025-02-14 DIAGNOSIS — E78.2 MIXED HYPERLIPIDEMIA: ICD-10-CM

## 2025-02-14 DIAGNOSIS — K51.30 ULCERATIVE RECTOSIGMOIDITIS WITHOUT COMPLICATION: ICD-10-CM

## 2025-02-14 DIAGNOSIS — Z13.29 THYROID DISORDER SCREEN: ICD-10-CM

## 2025-02-14 DIAGNOSIS — Z13.1 DIABETES MELLITUS SCREENING: ICD-10-CM

## 2025-02-15 LAB
ALBUMIN SERPL-MCNC: 4.6 G/DL (ref 3.8–4.9)
ALP SERPL-CCNC: 74 IU/L (ref 44–121)
ALT SERPL-CCNC: 18 IU/L (ref 0–32)
AST SERPL-CCNC: 18 IU/L (ref 0–40)
BASOPHILS # BLD AUTO: 0.1 X10E3/UL (ref 0–0.2)
BASOPHILS NFR BLD AUTO: 2 %
BILIRUB SERPL-MCNC: 0.4 MG/DL (ref 0–1.2)
BUN SERPL-MCNC: 15 MG/DL (ref 6–24)
BUN/CREAT SERPL: 17 (ref 9–23)
CALCIUM SERPL-MCNC: 9.4 MG/DL (ref 8.7–10.2)
CHLORIDE SERPL-SCNC: 103 MMOL/L (ref 96–106)
CHOLEST SERPL-MCNC: 271 MG/DL (ref 100–199)
CO2 SERPL-SCNC: 20 MMOL/L (ref 20–29)
CREAT SERPL-MCNC: 0.88 MG/DL (ref 0.57–1)
EGFRCR SERPLBLD CKD-EPI 2021: 79 ML/MIN/1.73
EOSINOPHIL # BLD AUTO: 0.2 X10E3/UL (ref 0–0.4)
EOSINOPHIL NFR BLD AUTO: 4 %
ERYTHROCYTE [DISTWIDTH] IN BLOOD BY AUTOMATED COUNT: 13 % (ref 11.7–15.4)
GLOBULIN SER CALC-MCNC: 2.8 G/DL (ref 1.5–4.5)
GLUCOSE SERPL-MCNC: 99 MG/DL (ref 70–99)
HBA1C MFR BLD: 5.3 % (ref 4.8–5.6)
HCT VFR BLD AUTO: 42 % (ref 34–46.6)
HDLC SERPL-MCNC: 66 MG/DL
HGB BLD-MCNC: 14 G/DL (ref 11.1–15.9)
IMM GRANULOCYTES # BLD AUTO: 0 X10E3/UL (ref 0–0.1)
IMM GRANULOCYTES NFR BLD AUTO: 0 %
LDL CALC COMMENT:: ABNORMAL
LDLC SERPL CALC-MCNC: 195 MG/DL (ref 0–99)
LYMPHOCYTES # BLD AUTO: 1.6 X10E3/UL (ref 0.7–3.1)
LYMPHOCYTES NFR BLD AUTO: 28 %
MCH RBC QN AUTO: 31.5 PG (ref 26.6–33)
MCHC RBC AUTO-ENTMCNC: 33.3 G/DL (ref 31.5–35.7)
MCV RBC AUTO: 95 FL (ref 79–97)
MONOCYTES # BLD AUTO: 0.3 X10E3/UL (ref 0.1–0.9)
MONOCYTES NFR BLD AUTO: 6 %
NEUTROPHILS # BLD AUTO: 3.4 X10E3/UL (ref 1.4–7)
NEUTROPHILS NFR BLD AUTO: 60 %
PLATELET # BLD AUTO: 269 X10E3/UL (ref 150–450)
POTASSIUM SERPL-SCNC: 4.4 MMOL/L (ref 3.5–5.2)
PROT SERPL-MCNC: 7.4 G/DL (ref 6–8.5)
RBC # BLD AUTO: 4.44 X10E6/UL (ref 3.77–5.28)
SODIUM SERPL-SCNC: 139 MMOL/L (ref 134–144)
TRIGL SERPL-MCNC: 64 MG/DL (ref 0–149)
TSH SERPL DL<=0.005 MIU/L-ACNC: 1.93 UIU/ML (ref 0.45–4.5)
VLDLC SERPL CALC-MCNC: 10 MG/DL (ref 5–40)
WBC # BLD AUTO: 5.6 X10E3/UL (ref 3.4–10.8)

## 2025-02-17 ENCOUNTER — TELEPHONE (OUTPATIENT)
Dept: FAMILY MEDICINE CLINIC | Facility: CLINIC | Age: 55
End: 2025-02-17
Payer: COMMERCIAL

## 2025-02-17 DIAGNOSIS — E78.2 MIXED HYPERLIPIDEMIA: Primary | ICD-10-CM

## 2025-02-17 DIAGNOSIS — F51.01 PRIMARY INSOMNIA: ICD-10-CM

## 2025-02-17 RX ORDER — ALPRAZOLAM 1 MG/1
1 TABLET ORAL NIGHTLY PRN
Qty: 90 TABLET | Refills: 0 | Status: SHIPPED | OUTPATIENT
Start: 2025-02-17

## 2025-02-17 RX ORDER — ROSUVASTATIN CALCIUM 10 MG/1
10 TABLET, COATED ORAL NIGHTLY
Qty: 90 TABLET | Refills: 1 | Status: SHIPPED | OUTPATIENT
Start: 2025-02-17

## 2025-02-17 NOTE — TELEPHONE ENCOUNTER
Requested Prescriptions:   Requested Prescriptions     Pending Prescriptions Disp Refills    ALPRAZolam (XANAX) 1 MG tablet 90 tablet 0     Sig: Take 1 tablet by mouth At Night As Needed for Anxiety.        Pharmacy where request should be sent: [facility]/PHARMACY #[id number] - [place], [place] - [address] [place] [address] AT [facility] - [phone]  - [phone] FX     Last office visit with prescribing clinician: 2/10/2025   Last telemedicine visit with prescribing clinician: 11/19/2024   Next office visit with prescribing clinician: 5/13/2025       Imani Lazaro Rep   02/17/25 16:19 EST

## 2025-02-17 NOTE — TELEPHONE ENCOUNTER
Medicaiton was sent to [facility] on 02/10.  [facility] does not have the medication.

## 2025-02-17 NOTE — TELEPHONE ENCOUNTER
"Contacted pt to let pt know that pcp states with recent lab results that:    \"Your cholesterol has worsened from one year ago and is significantly elevated. I would recommend medication for this. If agreeable, I can send in medication to your phamacy. The celiac panel is still pending at this time.\"    Pt verbally understood and states she is agreeable to start this medication.          "

## 2025-04-16 RX ORDER — BISOPROLOL FUMARATE 10 MG/1
10 TABLET, FILM COATED ORAL DAILY
Qty: 90 TABLET | Refills: 1 | Status: SHIPPED | OUTPATIENT
Start: 2025-04-16

## 2025-04-16 NOTE — TELEPHONE ENCOUNTER
Caller: Hector Kailey Powers    Relationship: Self    Best call back number: [phone]     Requested Prescriptions:   Requested Prescriptions     Pending Prescriptions Disp Refills    bisoprolol (ZEBeta) 10 MG tablet          Pharmacy where request should be sent: [facility] #[id number] - [place], [place] - [address] - [phone]  - [phone]     Last office visit with prescribing clinician: 2/10/2025   Last telemedicine visit with prescribing clinician: 11/19/2024   Next office visit with prescribing clinician: 5/13/2025     Imani Birch Rep   04/16/25 10:23 EDT

## 2025-05-13 ENCOUNTER — TELEMEDICINE (OUTPATIENT)
Dept: FAMILY MEDICINE CLINIC | Facility: CLINIC | Age: 55
End: 2025-05-13
Payer: COMMERCIAL

## 2025-05-13 DIAGNOSIS — F51.01 PRIMARY INSOMNIA: ICD-10-CM

## 2025-05-13 DIAGNOSIS — E78.2 MIXED HYPERLIPIDEMIA: ICD-10-CM

## 2025-05-13 DIAGNOSIS — F41.9 ANXIETY: ICD-10-CM

## 2025-05-13 DIAGNOSIS — J30.9 ALLERGIC RHINITIS, UNSPECIFIED SEASONALITY, UNSPECIFIED TRIGGER: Primary | ICD-10-CM

## 2025-05-13 PROCEDURE — 99214 OFFICE O/P EST MOD 30 MIN: CPT | Performed by: FAMILY MEDICINE

## 2025-05-13 RX ORDER — AZELASTINE 1 MG/ML
1 SPRAY, METERED NASAL 2 TIMES DAILY
Qty: 1 EACH | Refills: 6 | Status: SHIPPED | OUTPATIENT
Start: 2025-05-13

## 2025-05-13 RX ORDER — CHLORCYCLIZINE HYDROCHLORIDE AND PSEUDOEPHEDRINE HYDROCHLORIDE 25; 60 MG/1; MG/1
1 TABLET ORAL 2 TIMES DAILY PRN
Qty: 69 TABLET | Refills: 6 | Status: SHIPPED | OUTPATIENT
Start: 2025-05-13

## 2025-05-13 NOTE — PROGRESS NOTES
Video Visit E-Visit      Date: 2025   Patient Name: Kailey Young  : 1970   MRN: [id number]     Chief Complaint:    Chief Complaint   Patient presents with    Anxiety     Pt presents for a 3 month follow up via my chart video visit. Pt is currently taking Xanax to manage her anxiety and states she feels as though her sx are well managed; no concerns with medication, tolerating well; denies depressive sx; unable to provide vital signs at home       I have reviewed the E-Visit questionnaire and the patient's answers, my assessment and plan are listed below.     This provider is located at the [facility] (through [facility]), [address] [place] [address] using a secure [employer] Video Visit through [employer]. Patient is being seen remotely via telehealth at their home address on file in [place], and stated they are in a secure environment for this session. The patient's condition being diagnosed/treated is appropriate for telemedicine. The provider identified himself as well as his credentials. The patient, and/or patients guardian, consent to be seen remotely, and when consent is given they understand that the consent allows for patient identifiable information to be sent to a third party as needed. They may refuse to be seen remotely at any time. The electronic data is encrypted and password protected, and the patient and/or guardian has been advised of the potential risks to privacy not withstanding such measures.    You have chosen to receive care through a telehealth visit. Do you consent to use a video/audio connection for your medical care today? Yes    History of Present Illness: Kailey Young is a 55 y.o. female who is being seen today via a video visit for follow up with anxiety.  Patient states anxiety/insomnia is stable.  She is also requesting a prescription for Stahist.  She has been using Afrin for nasal congestion as well.   Patient also reports she is tolerating her cholesterol medication without issue.      Subjective      Review of Systems:   Review of Systems   HENT:  Positive for congestion.        I have reviewed and the following portions of the patient's history were updated as appropriate: past family history, past medical history, past social history, past surgical history and problem list.    Medications:     Current Outpatient Medications:     ALPRAZolam (XANAX) 1 MG tablet, Take 1 tablet by mouth At Night As Needed for Anxiety., Disp: 90 tablet, Rfl: 0    B Complex Vitamins (VITAMIN B COMPLEX 100 IJ), , Disp: , Rfl:     bisoprolol (ZEBeta) 10 MG tablet, Take 1 tablet by mouth Daily., Disp: 90 tablet, Rfl: 1    Chlorcyclizine-Pseudoephed (STAHIST AD PO), , Disp: , Rfl:     dexlansoprazole (DEXILANT) 60 MG capsule, Take 1 capsule by mouth Daily., Disp: , Rfl:     FLUoxetine (PROzac) 40 MG capsule, Take 2 capsules by mouth Daily., Disp: 180 capsule, Rfl: 3    lamoTRIgine (LaMICtal) 200 MG tablet, , Disp: , Rfl:     mesalamine (LIALDA) 1.2 g EC tablet, , Disp: , Rfl:     rosuvastatin (CRESTOR) 10 MG tablet, Take 1 tablet by mouth Every Night., Disp: 90 tablet, Rfl: 1    Allergies:   Allergies   Allergen Reactions    Codeine Nausea And Vomiting       Objective     Physical Exam:  Vital Signs: There were no vitals filed for this visit.  There is no height or weight on file to calculate BMI.    Physical Exam  Nursing note reviewed.   Constitutional:       Appearance: Normal appearance.   Neurological:      Mental Status: She is alert.   Psychiatric:         Mood and Affect: Mood normal.         Thought Content: Thought content normal.         Judgment: Judgment normal.           Assessment / Plan      Assessment/Plan:   Assessment & Plan  Allergic rhinitis, unspecified seasonality, unspecified trigger  Patient requested prescription for Stahist.  Will order today.  Patient has been using Afrin for nasal congestion.  I have  recommended against the use of Afrin.  Will prescribe azelastine to use in place of Afrin.  Orders:    Chlorcyclizine-Pseudoephed (Stahist AD) 25-60 MG tablet; Take 1 tablet by mouth 2 (Two) Times a Day As Needed (congestion).    azelastine (ASTELIN) 0.1 % nasal spray; Administer 1 spray into the nostril(s) as directed by provider 2 (Two) Times a Day.    Anxiety  Stable on current regimen.  Continue alprazolam nightly as needed for anxiety and insomnia.       Primary insomnia  As above.  PDMP reviewed and consistent with treatment.       Mixed hyperlipidemia  Patient has been tolerating rosuvastatin without issue.  Will order follow-up lipid profile and liver panel.    Orders:    Lipid Panel; Future    Comprehensive Metabolic Panel; Future         Follow Up:   No follow-ups on file.    Any medications prescribed have been sent electronically to   [id number] - [place], [place] - [address] [phone] PH - [phone]   [address] [address]  Phone: [phone] Fax: [phone]    [id number] - [place], [place] - [address] [place] [address] AT [place] [address] - [phone]  - [phone] [address] [address]  Phone: [phone] Fax: [phone]      Ben Arellano MD  [facility]   05/13/25  12:59 EDT

## 2025-05-29 ENCOUNTER — LAB (OUTPATIENT)
Dept: FAMILY MEDICINE CLINIC | Facility: CLINIC | Age: 55
End: 2025-05-29
Payer: COMMERCIAL

## 2025-05-29 ENCOUNTER — TELEPHONE (OUTPATIENT)
Dept: FAMILY MEDICINE CLINIC | Facility: CLINIC | Age: 55
End: 2025-05-29
Payer: COMMERCIAL

## 2025-05-29 DIAGNOSIS — K51.919 ULCERATIVE COLITIS WITH COMPLICATION, UNSPECIFIED LOCATION: Primary | ICD-10-CM

## 2025-05-29 DIAGNOSIS — E78.2 MIXED HYPERLIPIDEMIA: ICD-10-CM

## 2025-05-30 ENCOUNTER — RESULTS FOLLOW-UP (OUTPATIENT)
Dept: FAMILY MEDICINE CLINIC | Facility: CLINIC | Age: 55
End: 2025-05-30
Payer: COMMERCIAL

## 2025-05-30 LAB
ALBUMIN SERPL-MCNC: 4.2 G/DL (ref 3.5–5.2)
ALBUMIN/GLOB SERPL: 1.7 G/DL
ALP SERPL-CCNC: 67 U/L (ref 39–117)
ALT SERPL-CCNC: 26 U/L (ref 1–33)
AST SERPL-CCNC: 21 U/L (ref 1–32)
BASOPHILS # BLD AUTO: 0 X10E3/UL (ref 0–0.2)
BASOPHILS NFR BLD AUTO: 1 %
BILIRUB SERPL-MCNC: 0.4 MG/DL (ref 0–1.2)
BUN SERPL-MCNC: 13 MG/DL (ref 6–20)
BUN/CREAT SERPL: 17.3 (ref 7–25)
CALCIUM SERPL-MCNC: 8.9 MG/DL (ref 8.6–10.5)
CHLORIDE SERPL-SCNC: 107 MMOL/L (ref 98–107)
CHOLEST SERPL-MCNC: 141 MG/DL (ref 0–200)
CO2 SERPL-SCNC: 22.9 MMOL/L (ref 22–29)
CREAT SERPL-MCNC: 0.75 MG/DL (ref 0.57–1)
CRP SERPL-MCNC: <1 MG/L (ref 0–10)
EGFRCR SERPLBLD CKD-EPI 2021: 95.3 ML/MIN/1.73
EOSINOPHIL # BLD AUTO: 0.3 X10E3/UL (ref 0–0.4)
EOSINOPHIL NFR BLD AUTO: 5 %
ERYTHROCYTE [DISTWIDTH] IN BLOOD BY AUTOMATED COUNT: 13.3 % (ref 11.7–15.4)
GLOBULIN SER CALC-MCNC: 2.5 GM/DL
GLUCOSE SERPL-MCNC: 95 MG/DL (ref 65–99)
HCT VFR BLD AUTO: 36.4 % (ref 34–46.6)
HDLC SERPL-MCNC: 64 MG/DL (ref 40–60)
HGB BLD-MCNC: 11.7 G/DL (ref 11.1–15.9)
IMM GRANULOCYTES # BLD AUTO: 0 X10E3/UL (ref 0–0.1)
IMM GRANULOCYTES NFR BLD AUTO: 0 %
LDLC SERPL CALC-MCNC: 66 MG/DL (ref 0–100)
LYMPHOCYTES # BLD AUTO: 1.5 X10E3/UL (ref 0.7–3.1)
LYMPHOCYTES NFR BLD AUTO: 24 %
MCH RBC QN AUTO: 31.5 PG (ref 26.6–33)
MCHC RBC AUTO-ENTMCNC: 32.1 G/DL (ref 31.5–35.7)
MCV RBC AUTO: 98 FL (ref 79–97)
MONOCYTES # BLD AUTO: 0.3 X10E3/UL (ref 0.1–0.9)
MONOCYTES NFR BLD AUTO: 5 %
NEUTROPHILS # BLD AUTO: 3.9 X10E3/UL (ref 1.4–7)
NEUTROPHILS NFR BLD AUTO: 65 %
PLATELET # BLD AUTO: 198 X10E3/UL (ref 150–450)
POTASSIUM SERPL-SCNC: 4.5 MMOL/L (ref 3.5–5.2)
PROT SERPL-MCNC: 6.7 G/DL (ref 6–8.5)
RBC # BLD AUTO: 3.71 X10E6/UL (ref 3.77–5.28)
SODIUM SERPL-SCNC: 139 MMOL/L (ref 136–145)
TRIGL SERPL-MCNC: 52 MG/DL (ref 0–150)
VLDLC SERPL CALC-MCNC: 11 MG/DL (ref 5–40)
WBC # BLD AUTO: 6 X10E3/UL (ref 3.4–10.8)

## 2025-08-08 RX ORDER — FLUOXETINE HYDROCHLORIDE 40 MG/1
80 CAPSULE ORAL DAILY
Qty: 180 CAPSULE | Refills: 3 | OUTPATIENT
Start: 2025-08-08

## 2025-08-13 DIAGNOSIS — E78.2 MIXED HYPERLIPIDEMIA: ICD-10-CM

## 2025-08-13 RX ORDER — ROSUVASTATIN CALCIUM 10 MG/1
10 TABLET, COATED ORAL
Qty: 90 TABLET | Refills: 1 | Status: SHIPPED | OUTPATIENT
Start: 2025-08-13

## 2025-08-18 ENCOUNTER — OFFICE VISIT (OUTPATIENT)
Dept: INTERNAL MEDICINE | Facility: CLINIC | Age: 55
End: 2025-08-18
Payer: COMMERCIAL

## 2025-08-18 VITALS
WEIGHT: 145.8 LBS | DIASTOLIC BLOOD PRESSURE: 72 MMHG | HEIGHT: 65 IN | BODY MASS INDEX: 24.29 KG/M2 | OXYGEN SATURATION: 99 % | HEART RATE: 62 BPM | TEMPERATURE: 97.7 F | SYSTOLIC BLOOD PRESSURE: 112 MMHG

## 2025-08-18 DIAGNOSIS — K51.919 ULCERATIVE COLITIS WITH COMPLICATION, UNSPECIFIED LOCATION: ICD-10-CM

## 2025-08-18 DIAGNOSIS — F33.0 MILD EPISODE OF RECURRENT MAJOR DEPRESSIVE DISORDER: ICD-10-CM

## 2025-08-18 DIAGNOSIS — Z12.4 CERVICAL CANCER SCREENING: ICD-10-CM

## 2025-08-18 DIAGNOSIS — R92.8 ABNORMAL MAMMOGRAM: ICD-10-CM

## 2025-08-18 DIAGNOSIS — Z76.89 ENCOUNTER TO ESTABLISH CARE: Primary | ICD-10-CM

## 2025-08-18 DIAGNOSIS — F41.9 ANXIETY: ICD-10-CM

## 2025-08-18 DIAGNOSIS — F51.01 PRIMARY INSOMNIA: ICD-10-CM

## 2025-08-18 DIAGNOSIS — E78.00 HYPERCHOLESTEROLEMIA: ICD-10-CM

## 2025-08-18 PROCEDURE — 99214 OFFICE O/P EST MOD 30 MIN: CPT | Performed by: STUDENT IN AN ORGANIZED HEALTH CARE EDUCATION/TRAINING PROGRAM

## 2025-08-18 RX ORDER — ALPRAZOLAM 1 MG/1
1 TABLET ORAL NIGHTLY PRN
Qty: 90 TABLET | Refills: 0 | Status: SHIPPED | OUTPATIENT
Start: 2025-08-18

## 2025-08-23 LAB — DRUGS UR: NORMAL

## 2025-08-25 ENCOUNTER — PATIENT ROUNDING (BHMG ONLY) (OUTPATIENT)
Dept: INTERNAL MEDICINE | Facility: CLINIC | Age: 55
End: 2025-08-25
Payer: COMMERCIAL

## 2025-08-26 DIAGNOSIS — R92.343 EXTREMELY DENSE TISSUE OF BOTH BREASTS ON MAMMOGRAPHY: Primary | ICD-10-CM

## 2025-08-26 RX ORDER — LAMOTRIGINE 200 MG/1
200 TABLET ORAL DAILY
Qty: 90 TABLET | Refills: 3 | Status: SHIPPED | OUTPATIENT
Start: 2025-08-26

## (undated) DEVICE — PK SOL VISC ESOPH 80ML

## (undated) DEVICE — HLDR TBG NG

## (undated) DEVICE — PROB ESOPH COMFORTEC IMPEDANCE NON/INF 6F 2.3 18CM